# Patient Record
Sex: MALE | HISPANIC OR LATINO | Employment: FULL TIME | URBAN - METROPOLITAN AREA
[De-identification: names, ages, dates, MRNs, and addresses within clinical notes are randomized per-mention and may not be internally consistent; named-entity substitution may affect disease eponyms.]

---

## 2019-04-09 ENCOUNTER — OFFICE VISIT (OUTPATIENT)
Dept: URGENT CARE | Facility: PHYSICIAN GROUP | Age: 44
End: 2019-04-09
Payer: COMMERCIAL

## 2019-04-09 ENCOUNTER — TELEPHONE (OUTPATIENT)
Dept: SCHEDULING | Facility: IMAGING CENTER | Age: 44
End: 2019-04-09

## 2019-04-09 VITALS
WEIGHT: 200 LBS | DIASTOLIC BLOOD PRESSURE: 76 MMHG | OXYGEN SATURATION: 96 % | SYSTOLIC BLOOD PRESSURE: 122 MMHG | BODY MASS INDEX: 29.62 KG/M2 | TEMPERATURE: 98.3 F | HEIGHT: 69 IN | HEART RATE: 78 BPM

## 2019-04-09 DIAGNOSIS — M79.641 RIGHT HAND PAIN: ICD-10-CM

## 2019-04-09 DIAGNOSIS — M10.9 ACUTE GOUT OF RIGHT HAND, UNSPECIFIED CAUSE: ICD-10-CM

## 2019-04-09 PROCEDURE — 99203 OFFICE O/P NEW LOW 30 MIN: CPT | Performed by: NURSE PRACTITIONER

## 2019-04-09 RX ORDER — METHYLPREDNISOLONE 4 MG/1
4 TABLET ORAL DAILY
Qty: 1 KIT | Refills: 0 | Status: SHIPPED | OUTPATIENT
Start: 2019-04-09 | End: 2019-06-06

## 2019-04-09 RX ORDER — INDOMETHACIN 50 MG/1
50 CAPSULE ORAL 3 TIMES DAILY
Qty: 15 CAP | Refills: 0 | Status: SHIPPED | OUTPATIENT
Start: 2019-04-09 | End: 2019-06-06 | Stop reason: SDUPTHER

## 2019-04-09 RX ORDER — IBUPROFEN 200 MG
200 TABLET ORAL EVERY 6 HOURS PRN
COMMUNITY
End: 2019-06-06

## 2019-04-09 NOTE — LETTER
April 9, 2019         Patient: Jhonny Snyder   YOB: 1975   Date of Visit: 4/9/2019           To Whom it May Concern:    Jhonny Snyder was seen in my clinic on 4/9/2019. Please excuse him from work 4/10/19.        Sincerely,           CICI Singh.  Electronically Signed

## 2019-04-10 NOTE — PROGRESS NOTES
"Subjective:      Jhonny Snyder is a 43 y.o. male who presents with Gout (Rt wrist swelling and pain x1 wk )            This is a new problem. Pt reports new onset of right hand pain and swelling that started one week ago. He does report a hx of gout and this feels very similar. He denies any injury to his hand. He has been taking aleve without relief. Denies any fevers or flu like symptoms.        Review of Systems   Musculoskeletal:        Right hand pain and swelling   All other systems reviewed and are negative.    No past medical history on file. No past surgical history on file.   Social History     Social History   • Marital status: Single     Spouse name: N/A   • Number of children: N/A   • Years of education: N/A     Occupational History   • Not on file.     Social History Main Topics   • Smoking status: Not on file   • Smokeless tobacco: Not on file   • Alcohol use Not on file   • Drug use: Unknown   • Sexual activity: Not on file     Other Topics Concern   • Not on file     Social History Narrative   • No narrative on file          Objective:     /76 (BP Location: Left arm, Patient Position: Sitting, BP Cuff Size: Adult)   Pulse 78   Temp 36.8 °C (98.3 °F) (Temporal)   Ht 1.753 m (5' 9\")   Wt 90.7 kg (200 lb)   SpO2 96%   BMI 29.53 kg/m²      Physical Exam   Constitutional: He is oriented to person, place, and time. Vital signs are normal. He appears well-developed and well-nourished.   HENT:   Head: Normocephalic and atraumatic.   Eyes: Pupils are equal, round, and reactive to light. EOM are normal.   Neck: Normal range of motion.   Cardiovascular: Normal rate and regular rhythm.    Pulmonary/Chest: Effort normal.   Musculoskeletal: Normal range of motion.        Right hand: He exhibits tenderness and swelling. Normal sensation noted. Normal strength noted.        Hands:  Neurological: He is alert and oriented to person, place, and time.   Skin: Skin is warm and dry. Capillary refill " takes less than 2 seconds.   Psychiatric: He has a normal mood and affect. His speech is normal and behavior is normal. Thought content normal.   Vitals reviewed.              Assessment/Plan:     1. Right hand pain    2. Acute gout of right hand, unspecified cause  - MethylPREDNISolone (MEDROL DOSEPAK) 4 MG Tablet Therapy Pack; Take 1 Tab by mouth every day.  Dispense: 1 Kit; Refill: 0  - indomethacin (INDOCIN) 50 MG Cap; Take 1 Cap by mouth 3 times a day for 5 days.  Dispense: 15 Cap; Refill: 0    May take tylenol as needed for additional pain relief  Activity as tolerated with right hand  Work note provided  Supportive care, differential diagnoses, and indications for immediate follow-up discussed with patient.    Pathogenesis of diagnosis discussed including typical length and natural progression.      Instructed to return to  or nearest emergency department if symptoms fail to improve, for any change in condition, further concerns, or new concerning symptoms.  Patient states understanding of the plan of care and discharge instructions.

## 2019-06-06 ENCOUNTER — APPOINTMENT (OUTPATIENT)
Dept: RADIOLOGY | Facility: IMAGING CENTER | Age: 44
End: 2019-06-06
Attending: INTERNAL MEDICINE
Payer: COMMERCIAL

## 2019-06-06 ENCOUNTER — OFFICE VISIT (OUTPATIENT)
Dept: MEDICAL GROUP | Facility: PHYSICIAN GROUP | Age: 44
End: 2019-06-06
Payer: COMMERCIAL

## 2019-06-06 VITALS
HEIGHT: 69 IN | DIASTOLIC BLOOD PRESSURE: 78 MMHG | OXYGEN SATURATION: 94 % | TEMPERATURE: 98.3 F | BODY MASS INDEX: 28.58 KG/M2 | SYSTOLIC BLOOD PRESSURE: 116 MMHG | WEIGHT: 193 LBS | HEART RATE: 74 BPM

## 2019-06-06 DIAGNOSIS — F10.21 CHRONIC ALCOHOLISM IN REMISSION (HCC): ICD-10-CM

## 2019-06-06 DIAGNOSIS — Z23 NEED FOR VACCINATION: ICD-10-CM

## 2019-06-06 DIAGNOSIS — M10.9 GOUT OF MULTIPLE SITES, UNSPECIFIED CAUSE, UNSPECIFIED CHRONICITY: ICD-10-CM

## 2019-06-06 DIAGNOSIS — Z13.228 ENCOUNTER FOR SCREENING FOR METABOLIC DISORDER: ICD-10-CM

## 2019-06-06 DIAGNOSIS — S62.001A CLOSED NONDISPLACED FRACTURE OF SCAPHOID OF RIGHT WRIST, UNSPECIFIED PORTION OF SCAPHOID, INITIAL ENCOUNTER: ICD-10-CM

## 2019-06-06 DIAGNOSIS — M10.9 ACUTE GOUT OF RIGHT HAND, UNSPECIFIED CAUSE: ICD-10-CM

## 2019-06-06 PROBLEM — Z00.00 HEALTHCARE MAINTENANCE: Status: ACTIVE | Noted: 2019-06-06

## 2019-06-06 PROBLEM — M1A.09X0 CHRONIC GOUT OF MULTIPLE SITES: Status: ACTIVE | Noted: 2019-06-06

## 2019-06-06 PROCEDURE — 99204 OFFICE O/P NEW MOD 45 MIN: CPT | Mod: 25 | Performed by: INTERNAL MEDICINE

## 2019-06-06 PROCEDURE — 90471 IMMUNIZATION ADMIN: CPT | Performed by: INTERNAL MEDICINE

## 2019-06-06 PROCEDURE — 77077 JOINT SURVEY SINGLE VIEW: CPT | Mod: TC | Performed by: INTERNAL MEDICINE

## 2019-06-06 PROCEDURE — 90715 TDAP VACCINE 7 YRS/> IM: CPT | Performed by: INTERNAL MEDICINE

## 2019-06-06 RX ORDER — INDOMETHACIN 50 MG/1
50 CAPSULE ORAL 2 TIMES DAILY PRN
Qty: 30 CAP | Refills: 1 | Status: SHIPPED | OUTPATIENT
Start: 2019-06-06 | End: 2019-11-12 | Stop reason: SDUPTHER

## 2019-06-06 RX ORDER — ALLOPURINOL 300 MG/1
300 TABLET ORAL DAILY
Qty: 90 TAB | Refills: 1 | Status: SHIPPED | OUTPATIENT
Start: 2019-06-06 | End: 2019-06-06

## 2019-06-06 ASSESSMENT — PATIENT HEALTH QUESTIONNAIRE - PHQ9: CLINICAL INTERPRETATION OF PHQ2 SCORE: 0

## 2019-06-06 NOTE — ASSESSMENT & PLAN NOTE
This is a new problem which patient says he got diagnosed recently and got treatment in April 2019. Currently has pain in the Rt wrist pain which started 2 months back. He visited UC at that time and got the medications Indomethacin and medrol dosepak. Patient does say that he had previous attacks in 2015 on the great toes of both feet, right knee joint.

## 2019-06-06 NOTE — ASSESSMENT & PLAN NOTE
Patient has been drinking alcohol every weekend which was heavy as mentioned in the past but since 2015 when he had the first gout attack at the time patient quit drinking and has been sober since then.

## 2019-06-06 NOTE — PROGRESS NOTES
CC: Establish care with new PCP, gout.    HISTORY OF THE PRESENT ILLNESS: Patient is a 43 y.o. male. This pleasant patient is here today to discuss on medical conditions as mentioned in HPI below.    Health Maintenance: Due for tetanus vaccine okay to get in the office today.      Gout of multiple sites  This is a new problem which patient says he got diagnosed recently and got treatment in April 2019. Currently has pain in the Rt wrist pain which started 2 months back. He visited  at that time and got the medications Indomethacin and medrol dosepak. Patient does say that he had previous attacks in 2015 on the great toes of both feet, right knee joint.    Chronic alcoholism in remission (HCC)  Patient has been drinking alcohol every weekend which was heavy as mentioned in the past but since 2015 when he had the first gout attack at the time patient quit drinking and has been sober since then.    PHQ score 0, BMI within normal limits, former tobacco, no fall injuries    Allergies: Patient has no known allergies.    Current Outpatient Prescriptions Ordered in [facility]   Medication Sig Dispense Refill   • allopurinol (ZYLOPRIM) 300 MG Tab Take 1 Tab by mouth every day. 90 Tab 1     No current Epic-ordered facility-administered medications on file.        History reviewed. No pertinent past medical history.    History reviewed. No pertinent surgical history.    Social History   Substance Use Topics   • Smoking status: Former Smoker     Packs/day: 0.50     Years: 25.00     Types: Cigarettes     Quit date: 1/1/2016   • Smokeless tobacco: Never Used      Comment: Started in late 30's 1PP/ 2 days.   • Alcohol use No      Comment: Stopped when diagnosed with gout. Before that heavy drinking on weekends       Social History     Social History Narrative   • No narrative on file       Family History   Problem Relation Age of Onset   • Cancer Mother         ?Unspecified   • Hypertension Father    • Diabetes Father        ROS:      "- Constitutional: Negative for fever, chills, unexpected weight change, and fatigue/generalized weakness.     - HEENT: Negative for headaches, vision changes, hearing changes, ear pain, ear discharge, rhinorrhea, sinus congestion, sore throat, and neck pain.      - Respiratory: Negative for cough, sputum production, chest congestion, dyspnea, wheezing, and crackles.      - Cardiovascular: Negative for chest pain, palpitations, orthopnea, and bilateral lower extremity edema.     - Gastrointestinal: Negative for heartburn, nausea, vomiting, abdominal pain, hematochezia, melena, diarrhea, constipation, and greasy/foul-smelling stools.     - Genitourinary: Negative for dysuria, polyuria, hematuria, pyuria, urinary urgency, and urinary incontinence.     - Musculoskeletal: As mentioned in HPI above negative for myalgias, back pain.     - Skin: Negative for rash, itching, cyanotic skin color change.     - Neurological: Negative for dizziness, tingling, tremors, focal sensory deficit, focal weakness and headaches.     - Endo/Heme/Allergies: Does not bruise/bleed easily.     - Psychiatric/Behavioral: Negative for depression, suicidal/homicidal ideation and memory loss.        No recent labs done, patient requesting for all the baseline labs for his age.    Exam: /78 (BP Location: Right arm, Patient Position: Sitting, BP Cuff Size: Adult)   Pulse 74   Temp 36.8 °C (98.3 °F) (Temporal)   Ht 1.753 m (5' 9\")   Wt 87.5 kg (193 lb)   SpO2 94%  Body mass index is 28.5 kg/m².    General: Normal appearing. No distress.  HEENT: Normocephalic. Eyes conjunctiva clear lids without ptosis, pupils equal and reactive to light accommodation, ears normal shape and contour, canals are clear bilaterally, tympanic membranes are benign, nasal mucosa benign, oropharynx is without erythema, edema or exudates.   Neck: Supple without JVD or bruit. Thyroid is not enlarged.  Pulmonary: Clear to ausculation.  Normal effort. No rales, ronchi, " or wheezing.  Cardiovascular: Regular rate and rhythm without murmur. Carotid and radial pulses are intact and equal bilaterally.  Abdomen: Soft, nontender, nondistended. Normal bowel sounds. Liver and spleen are not palpable  Neurologic: Grossly nonfocal  Lymph: No cervical, supraclavicular or axillary lymph nodes are palpable  Skin: Warm and dry.  No obvious lesions.  Musculoskeletal: Normal gait. No extremity cyanosis, clubbing, or edema.  Wrist exam : Positive for tenderness on the right wrist on palpation, restriction of range of movements with positive Phalen's test more on the right compared to the left.   No other acute problems in other joints at this time.  Psych: Normal mood and affect. Alert and oriented x3. Judgment and insight is normal.    Please note that this dictation was created using voice recognition software. I have made every reasonable attempt to correct obvious errors, but I expect that there are errors of grammar and possibly content that I did not discover before finalizing the note.      Assessment/Plan  1. Gout of multiple sites, unspecified cause, unspecified chronicity  New problem, recent urgent care visit with treatment for symptoms with a Medrol Dosepak and indomethacin.  Patient requesting for permanent treatment for this to it prevent the attacks.  No lab work done anytime.  Given his multiple joint involvement will also do a rheumatoid arthritis panel along with his uric acid levels at this time.  Patient was explained all the plan which he understood and agreed.  We will start him on allopurinol 300 mg daily.  - RHEUMATOID ARTHRITIS FACTOR; Future  - WESTERGREN SED RATE; Future  - CCP ANTIBODY; Future  - URIC ACID; Future  - DX-JOINT SURVEY-HANDS SINGLE VIEW; Future  - allopurinol (ZYLOPRIM) 300 MG Tab; Take 1 Tab by mouth every day.  Dispense: 90 Tab; Refill: 1    2. Encounter for screening for metabolic disorder  We will do the baseline labs including CBC renal function and  lipid panel for his age.  - CBC WITH DIFFERENTIAL; Future  - Comp Metabolic Panel; Future  - Lipid Profile; Future    3. Need for vaccination  - Tdap =>8yo IM    4. Chronic alcoholism in remission (HCC)  Given instructions to abstain from alcohol.

## 2019-06-14 LAB
ALBUMIN SERPL-MCNC: 4.5 G/DL (ref 3.5–5.5)
ALBUMIN/GLOB SERPL: 1.9 {RATIO} (ref 1.2–2.2)
ALP SERPL-CCNC: 72 IU/L (ref 39–117)
ALT SERPL-CCNC: 16 IU/L (ref 0–44)
AST SERPL-CCNC: 15 IU/L (ref 0–40)
BASOPHILS # BLD AUTO: 0 X10E3/UL (ref 0–0.2)
BASOPHILS NFR BLD AUTO: 1 %
BILIRUB SERPL-MCNC: 0.5 MG/DL (ref 0–1.2)
BUN SERPL-MCNC: 12 MG/DL (ref 6–24)
BUN/CREAT SERPL: 12 (ref 9–20)
CALCIUM SERPL-MCNC: 9.3 MG/DL (ref 8.7–10.2)
CCP IGA+IGG SERPL IA-ACNC: 3 UNITS (ref 0–19)
CHLORIDE SERPL-SCNC: 106 MMOL/L (ref 96–106)
CHOLEST SERPL-MCNC: 128 MG/DL (ref 100–199)
CO2 SERPL-SCNC: 24 MMOL/L (ref 20–29)
CREAT SERPL-MCNC: 0.97 MG/DL (ref 0.76–1.27)
EOSINOPHIL # BLD AUTO: 0.1 X10E3/UL (ref 0–0.4)
EOSINOPHIL NFR BLD AUTO: 3 %
ERYTHROCYTE [DISTWIDTH] IN BLOOD BY AUTOMATED COUNT: 12.5 % (ref 12.3–15.4)
ERYTHROCYTE [SEDIMENTATION RATE] IN BLOOD BY WESTERGREN METHOD: 2 MM/HR (ref 0–15)
GLOBULIN SER CALC-MCNC: 2.4 G/DL (ref 1.5–4.5)
GLUCOSE SERPL-MCNC: 97 MG/DL (ref 65–99)
HCT VFR BLD AUTO: 43.7 % (ref 37.5–51)
HDLC SERPL-MCNC: 45 MG/DL
HGB BLD-MCNC: 14.7 G/DL (ref 13–17.7)
IMM GRANULOCYTES # BLD AUTO: 0 X10E3/UL (ref 0–0.1)
IMM GRANULOCYTES NFR BLD AUTO: 0 %
IMMATURE CELLS  115398: NORMAL
LABORATORY COMMENT REPORT: NORMAL
LDLC SERPL CALC-MCNC: 68 MG/DL (ref 0–99)
LYMPHOCYTES # BLD AUTO: 1.4 X10E3/UL (ref 0.7–3.1)
LYMPHOCYTES NFR BLD AUTO: 32 %
MCH RBC QN AUTO: 29.9 PG (ref 26.6–33)
MCHC RBC AUTO-ENTMCNC: 33.6 G/DL (ref 31.5–35.7)
MCV RBC AUTO: 89 FL (ref 79–97)
MONOCYTES # BLD AUTO: 0.5 X10E3/UL (ref 0.1–0.9)
MONOCYTES NFR BLD AUTO: 10 %
MORPHOLOGY BLD-IMP: NORMAL
NEUTROPHILS # BLD AUTO: 2.4 X10E3/UL (ref 1.4–7)
NEUTROPHILS NFR BLD AUTO: 54 %
NRBC BLD AUTO-RTO: NORMAL %
PLATELET # BLD AUTO: 214 X10E3/UL (ref 150–450)
POTASSIUM SERPL-SCNC: 4.6 MMOL/L (ref 3.5–5.2)
PROT SERPL-MCNC: 6.9 G/DL (ref 6–8.5)
RBC # BLD AUTO: 4.92 X10E6/UL (ref 4.14–5.8)
RHEUMATOID FACT SERPL-ACNC: <10 IU/ML (ref 0–13.9)
SODIUM SERPL-SCNC: 143 MMOL/L (ref 134–144)
TRIGL SERPL-MCNC: 77 MG/DL (ref 0–149)
URATE SERPL-MCNC: 7.8 MG/DL (ref 3.7–8.6)
VLDLC SERPL CALC-MCNC: 15 MG/DL (ref 5–40)
WBC # BLD AUTO: 4.3 X10E3/UL (ref 3.4–10.8)

## 2019-09-19 ENCOUNTER — APPOINTMENT (OUTPATIENT)
Dept: RADIOLOGY | Facility: IMAGING CENTER | Age: 44
End: 2019-09-19
Attending: FAMILY MEDICINE
Payer: COMMERCIAL

## 2019-09-19 ENCOUNTER — OFFICE VISIT (OUTPATIENT)
Dept: MEDICAL GROUP | Facility: CLINIC | Age: 44
End: 2019-09-19
Payer: COMMERCIAL

## 2019-09-19 VITALS
BODY MASS INDEX: 28.58 KG/M2 | HEIGHT: 69 IN | WEIGHT: 193 LBS | SYSTOLIC BLOOD PRESSURE: 132 MMHG | TEMPERATURE: 98.1 F | RESPIRATION RATE: 16 BRPM | OXYGEN SATURATION: 97 % | DIASTOLIC BLOOD PRESSURE: 90 MMHG | HEART RATE: 96 BPM

## 2019-09-19 DIAGNOSIS — S62.021K CLOSED DISPLACED FRACTURE OF MIDDLE THIRD OF SCAPHOID OF RIGHT WRIST WITH NONUNION: ICD-10-CM

## 2019-09-19 PROCEDURE — 99203 OFFICE O/P NEW LOW 30 MIN: CPT | Performed by: FAMILY MEDICINE

## 2019-09-19 PROCEDURE — 73110 X-RAY EXAM OF WRIST: CPT | Mod: TC,RT | Performed by: FAMILY MEDICINE

## 2019-09-19 ASSESSMENT — ENCOUNTER SYMPTOMS
VOMITING: 0
FEVER: 0
DIZZINESS: 0
NAUSEA: 0
CHILLS: 0
SHORTNESS OF BREATH: 0

## 2019-09-19 NOTE — PROGRESS NOTES
"Subjective:      Jhonny Snyder is a 44 y.o. male who presents with Wrist Injury (Referral from PCP/ R wrist injury )     Referred by Kristal Melo M.D. for evaluation of RIGHT wrist pain    HPI  RIGHT wrist pain  Date of incident, April, second or third of 2019  He was tugging and rolling upholstery for stapling  As he was doing the rolling maneuver he felt a pop at the radial side of the RIGHT wrist  Worse with activity, gripping  Improved with rest  Strong, sharp burning pain  Radiates predominantly in the RADIAL side of the wrist and the ulnar side of the wrist  Wrist brace has helped temporarily  POSITIVE night symptoms  POSITIVE prior history of gout, took some gout medication which helped initially  Had x-rays in July and was diagnosed \"fractured wrist\"    Replaces upholstery    Review of Systems   Constitutional: Negative for chills and fever.   Respiratory: Negative for shortness of breath.    Cardiovascular: Negative for chest pain.   Gastrointestinal: Negative for nausea and vomiting.   Neurological: Negative for dizziness.     PMH:  has no past medical history on file.  MEDS:   Current Outpatient Medications:   •  indomethacin (INDOCIN) 50 MG Cap, Take 1 Cap by mouth 2 times a day as needed., Disp: 30 Cap, Rfl: 1  ALLERGIES: No Known Allergies  SURGHX: History reviewed. No pertinent surgical history.  SOCHX:  reports that he quit smoking about 3 years ago. His smoking use included cigarettes. He has a 12.50 pack-year smoking history. He has never used smokeless tobacco. He reports that he does not drink alcohol or use drugs.  FH: Family history was reviewed, no pertinent findings to report     Objective:     /90 (BP Location: Right arm, Patient Position: Sitting, BP Cuff Size: Adult)   Pulse 96   Temp 36.7 °C (98.1 °F) (Temporal)   Resp 16   Ht 1.753 m (5' 9\")   Wt 87.5 kg (193 lb)   SpO2 97%   BMI 28.50 kg/m²      Physical Exam     Hand exam    NAD  Alert and oriented    BILATERAL " "ELBOW exam  Range of motion intact  No swelling  No tenderness the medial or lateral epicondyle  Jacobo test NEGATIVE    BILATERAL WRIST exam   Range of motion limited to approximately 70% normal motion BILATERALLY  No tenderness along scaphoid, TFCC insertion, distal radius or distal ulna  Tinel's testing is NEGATIVE  The hand is otherwise neurovascularly intact    Assessment/Plan:      1. 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  DX-WRIST-COMPLETE 3+ RIGHT    REFERRAL TO ORTHOPEDICS     Does recall remote history of back in 2015 when he fell with a closed fist onto the ground  After that injury took him approximately \"1 month to recover\"  He did not seek any medical care at that time    Suspect his RIGHT wrist injury back in 2015 resulted in poorly healed RIGHT scaphoid fracture and he broke through while upholstering as noted above    Since this is a old injury, but a vital component of wrist function, recommend hand specialist/orthopedic evaluation for consideration of salvage procedure (referral to orthopedic hand specialist)          9/19/2019 3:57 PM    HISTORY/REASON FOR EXAM:  Atraumatic Pain/Swelling/Deformity.  Persistent RIGHT wrist pain, injury approximately 5 months ago.    TECHNIQUE/EXAM DESCRIPTION AND NUMBER OF VIEWS:  4 views of the RIGHT wrist.    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  Evidence of old trauma to the proximal 4th and 5th metacarpals.  Mild narrowing of radiocarpal joint.  No focal soft tissue swelling.      Impression       1.  No acute fracture or dislocation of RIGHT wrist.  2.  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  3.  Evidence of old trauma of the 4th and 5th metacarpals.     Interpreted in the office today with the patient    Thank you Kristal Melo M.D. for allowing me to participate in caring for your patient.  "

## 2019-11-12 ENCOUNTER — APPOINTMENT (OUTPATIENT)
Dept: RADIOLOGY | Facility: IMAGING CENTER | Age: 44
End: 2019-11-12
Attending: INTERNAL MEDICINE
Payer: COMMERCIAL

## 2019-11-12 ENCOUNTER — OFFICE VISIT (OUTPATIENT)
Dept: MEDICAL GROUP | Facility: PHYSICIAN GROUP | Age: 44
End: 2019-11-12
Payer: COMMERCIAL

## 2019-11-12 VITALS
SYSTOLIC BLOOD PRESSURE: 130 MMHG | BODY MASS INDEX: 28.29 KG/M2 | HEIGHT: 69 IN | WEIGHT: 191 LBS | TEMPERATURE: 98 F | DIASTOLIC BLOOD PRESSURE: 80 MMHG | OXYGEN SATURATION: 98 % | HEART RATE: 107 BPM

## 2019-11-12 DIAGNOSIS — M25.532 LEFT WRIST PAIN: ICD-10-CM

## 2019-11-12 DIAGNOSIS — S62.001A CLOSED NONDISPLACED FRACTURE OF SCAPHOID OF RIGHT WRIST, UNSPECIFIED PORTION OF SCAPHOID, INITIAL ENCOUNTER: ICD-10-CM

## 2019-11-12 DIAGNOSIS — M79.675 GREAT TOE PAIN, LEFT: ICD-10-CM

## 2019-11-12 DIAGNOSIS — M10.9 ACUTE GOUT OF MULTIPLE SITES, UNSPECIFIED CAUSE: ICD-10-CM

## 2019-11-12 PROBLEM — S62.021A: Status: ACTIVE | Noted: 2019-11-12

## 2019-11-12 PROCEDURE — 73620 X-RAY EXAM OF FOOT: CPT | Mod: TC,LT | Performed by: INTERNAL MEDICINE

## 2019-11-12 PROCEDURE — 73100 X-RAY EXAM OF WRIST: CPT | Mod: TC,LT | Performed by: INTERNAL MEDICINE

## 2019-11-12 PROCEDURE — 99214 OFFICE O/P EST MOD 30 MIN: CPT | Performed by: INTERNAL MEDICINE

## 2019-11-12 RX ORDER — ALLOPURINOL 300 MG/1
TABLET ORAL
Qty: 90 TAB | Refills: 2 | Status: SHIPPED | OUTPATIENT
Start: 2019-11-12 | End: 2021-09-18 | Stop reason: SDUPTHER

## 2019-11-12 RX ORDER — ALLOPURINOL 300 MG/1
TABLET ORAL
COMMUNITY
End: 2019-11-12 | Stop reason: SDUPTHER

## 2019-11-12 RX ORDER — INDOMETHACIN 50 MG/1
50 CAPSULE ORAL 2 TIMES DAILY PRN
Qty: 30 CAP | Refills: 1 | Status: ON HOLD | OUTPATIENT
Start: 2019-11-12 | End: 2021-04-03

## 2019-11-12 RX ORDER — OXYCODONE AND ACETAMINOPHEN 10; 325 MG/1; MG/1
1-2 TABLET ORAL EVERY 4 HOURS PRN
COMMUNITY
End: 2021-04-02

## 2019-11-12 RX ORDER — COLCHICINE 0.6 MG/1
TABLET ORAL
Qty: 10 TAB | Refills: 3 | Status: ON HOLD | OUTPATIENT
Start: 2019-11-12 | End: 2021-04-03

## 2019-11-12 NOTE — ASSESSMENT & PLAN NOTE
This is a new problem which was diagnosed in 06/2019 and was referred to [facility] , will need to get the records.  11/5/2019 - S/P extraction of the fractured scaphoid bone. Currently on Percocet managed by [facility].

## 2019-11-12 NOTE — PROGRESS NOTES
CC: Acute visit, gout.    HISTORY OF PRESENT ILLNESS: Patient is a 44 y.o. male established patient who presents today to discuss on medical conditions as mentioned in HPI below.    Health Maintenance: Due for flu vaccine refusing, all risks understood.    Closed nondisplaced fracture of scaphoid bone of right wrist  This is a new problem which was diagnosed in 06/2019 and was referred to [facility] , will need to get the records.  11/5/2019 - S/P extraction of the fractured scaphoid bone. Currently on Percocet managed by [facility].     Left wrist pain  This is a new problem which started 3 days back and denies any trauma . Does have swelling of the left great toe. Pt supposed to be on allopurinol which he stopped after his first bottle in 06/2019 more than 3-4 months back.      PHQ score 0, BMI within normal limits, former tobacco, no fall injuries.    Patient Active Problem List    Diagnosis Date Noted   • Closed nondisplaced fracture of scaphoid bone of right wrist 11/12/2019   • Left wrist pain 11/12/2019   • Great toe pain, left 11/12/2019   • Gout of multiple sites 06/06/2019   • Healthcare maintenance 06/06/2019   • Chronic alcoholism in remission (HCC) 06/06/2019      Allergies:Patient has no known allergies.    Current Outpatient Medications   Medication Sig Dispense Refill   • oxyCODONE-acetaminophen (PERCOCET-10)  MG Tab Take 1-2 Tabs by mouth every four hours as needed for Severe Pain.     • indomethacin (INDOCIN) 50 MG Cap Take 1 Cap by mouth 2 times a day as needed. 30 Cap 1   • allopurinol (ZYLOPRIM) 300 MG Tab allopurinol 300 mg tablet 90 Tab 2     No current facility-administered medications for this visit.        Social History     Tobacco Use   • Smoking status: Former Smoker     Packs/day: 0.50     Years: 25.00     Pack years: 12.50     Types: Cigarettes     Last attempt to quit: 1/1/2016     Years since quitting: 3.8   • Smokeless tobacco: Never Used   • Tobacco comment: Started in late 30's 1PP/ 2  "days.   Substance Use Topics   • Alcohol use: Not Currently   • Drug use: No     Social History     Patient does not qualify to have social determinant information on file (likely too young).   Social History Narrative   • Not on file       Family History   Problem Relation Age of Onset   • Cancer Mother         ?Unspecified   • Hypertension Father    • Diabetes Father         ROS:     - Constitutional:  Negative for fever, chills, unexpected weight change, and fatigue/generalized weakness.    - HEENT:  Negative for headaches, vision changes, hearing changes, ear pain, ear discharge, rhinorrhea, sinus congestion, sore throat, and neck pain.      - Respiratory: Negative for cough, sputum production, chest congestion, dyspnea, wheezing, and crackles.      - Cardiovascular: Negative for chest pain, palpitations, orthopnea, and bilateral lower extremity edema.     - Gastrointestinal: Negative for heartburn, nausea, vomiting, abdominal pain, hematochezia, melena, diarrhea, constipation, and greasy/foul-smelling stools.     - Genitourinary: Negative for dysuria, polyuria, hematuria, pyuria, urinary urgency, and urinary incontinence.     - Musculoskeletal:As mentioned in HPI above Negative for myalgias, back pain.     - Skin: Negative for rash, itching, cyanotic skin color change.     - Neurological: Negative for dizziness, tingling, tremors, focal sensory deficit, focal weakness and headaches.     - Endo/Heme/Allergies: Does not bruise/bleed easily.     - Psychiatric/Behavioral: Negative for depression, suicidal/homicidal ideation and memory loss.      Last lab work in June 2019 reviewed and discussed with patient.    Exam:    /80 (BP Location: Left arm, Patient Position: Sitting, BP Cuff Size: Adult)   Pulse (!) 107   Temp 36.7 °C (98 °F) (Temporal)   Ht 1.753 m (5' 9\")   Wt 86.6 kg (191 lb)   SpO2 98%  Body mass index is 28.21 kg/m².    General:  Well nourished, well developed male in NAD  Head is grossly " normal.  Neck: Supple without JVD or bruit. Thyroid is not enlarged.  Pulmonary: Clear to ausculation and percussion.  Normal effort. No rales, ronchi, or wheezing.  Cardiovascular: Regular rate and rhythm without murmur. Carotid and radial pulses are intact and equal bilaterally.  Extremities: no clubbing, cyanosis, or edema.  Joint exam : Positive for acute swelling, erythema and tenderness on left wrist joint, left great toe at metatarsal head.  Range of movements not tested due to pain.  Right wrist intact on cast and mild swelling on the fingers but no color changes due to the recent surgery done as mentioned in HPI above.    Please note that this dictation was created using voice recognition software. I have made every reasonable attempt to correct obvious errors, but I expect that there are errors of grammar and possibly content that I did not discover before finalizing the note.    Assessment/Plan:  1. Acute gout of multiple sites, unspecified cause  2. Left wrist pain  3. Great toe pain, left  New problem, as mentioned in HPI above my physical exam findings will give him a course of colchicine for 2 days but patient also requesting refills of his indomethacin which emphasized to take it only after he completes colchicine.  Emphasized to be compliant with his allopurinol which patient has stopped taking and this flareup of gout again.  Also discussed on the foods he needs to avoid which was extensively discussed in the last visit.  - indomethacin (INDOCIN) 50 MG Cap; Take 1 Cap by mouth 2 times a day as needed.  Dispense: 30 Cap; Refill: 1  - allopurinol (ZYLOPRIM) 300 MG Tab; allopurinol 300 mg tablet  Dispense: 90 Tab; Refill: 2  - DX-FOOT-2- LEFT; Future  - colchicine (COLCRYS) 0.6 MG Tab; Take 2 tablets one time and then 1 tablet of the 1 hour.  Repeat the same after 3 days if no relief.  Dispense: 10 Tab; Refill: 3      4. Closed nondisplaced fracture of scaphoid of right wrist, unspecified portion of  scaphoid, initial encounter  Improving, as mentioned in history above my physical exam findings with recent surgery done at [facility] will get the records.  To follow-up with [facility].

## 2019-11-13 ENCOUNTER — TELEPHONE (OUTPATIENT)
Dept: URGENT CARE | Facility: PHYSICIAN GROUP | Age: 44
End: 2019-11-13

## 2019-11-13 NOTE — TELEPHONE ENCOUNTER
----- Message from Kristal Melo M.D. sent at 11/12/2019  8:42 PM PST -----  Your X ray was negative for fractures. Does have degenerative Osteoarthritis.  Kristal Melo M.D.

## 2019-11-13 NOTE — TELEPHONE ENCOUNTER
----- Message from Kristal Melo M.D. sent at 11/12/2019  8:42 PM PST -----  Your X ray was negative for fractures. Does have Gouty arthritis.  Kristal Melo M.D.

## 2020-09-17 ENCOUNTER — OFFICE VISIT (OUTPATIENT)
Dept: URGENT CARE | Facility: PHYSICIAN GROUP | Age: 45
End: 2020-09-17
Payer: COMMERCIAL

## 2020-09-17 VITALS
SYSTOLIC BLOOD PRESSURE: 142 MMHG | HEART RATE: 78 BPM | RESPIRATION RATE: 16 BRPM | WEIGHT: 190 LBS | DIASTOLIC BLOOD PRESSURE: 86 MMHG | HEIGHT: 69 IN | TEMPERATURE: 97.5 F | OXYGEN SATURATION: 96 % | BODY MASS INDEX: 28.14 KG/M2

## 2020-09-17 DIAGNOSIS — M79.641 RIGHT HAND PAIN: ICD-10-CM

## 2020-09-17 PROCEDURE — 99213 OFFICE O/P EST LOW 20 MIN: CPT | Performed by: NURSE PRACTITIONER

## 2020-09-17 ASSESSMENT — FIBROSIS 4 INDEX: FIB4 SCORE: 0.81

## 2020-09-17 NOTE — PROGRESS NOTES
Subjective:   Jhonny Snyder is a 45 y.o. male who presents for Wrist Pain (right wrist pain, has surgery a year ago , swelling, x3 days )       Wrist Pain   The incident occurred 3 to 5 days ago. Incident location: in general. Injury mechanism: prior surgery, right wrist. The pain is present in the right wrist. The quality of the pain is described as aching. The pain does not radiate. The pain is mild. The pain has been fluctuating since the incident. Pertinent negatives include no chest pain. The symptoms are aggravated by movement. He has tried acetaminophen, NSAIDs and immobilization for the symptoms. The treatment provided moderate relief.     Pt presents for evaluation of a new problem, reports right wrist pain.  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  Over the past 2 days, he did not wear his wrist brace, subsequently having right wrist pain and swelling.  Due to this, his employer is requesting evaluation and work note.  Patient states that this is not a work-related injury.    Review of Systems   Constitutional: Negative for chills, fever, malaise/fatigue and weight loss.   HENT: Negative for congestion and sore throat.    Respiratory: Negative for cough and shortness of breath.    Cardiovascular: Negative for chest pain, orthopnea and leg swelling.   Gastrointestinal: Negative for abdominal pain, nausea and vomiting.   Genitourinary: Negative for dysuria.   Musculoskeletal: Positive for joint pain. Negative for falls and myalgias.   Neurological: Negative for weakness and headaches.       MEDS:   Current Outpatient Medications:   •  oxyCODONE-acetaminophen (PERCOCET-10)  MG Tab, Take 1-2 Tabs by mouth every four hours as needed for Severe Pain., Disp: , Rfl:   •  indomethacin (INDOCIN) 50 MG Cap, Take 1 Cap by mouth 2 times a day as needed. (Patient not taking: Reported on 9/17/2020), Disp: 30 Cap, Rfl: 1  •   "allopurinol (ZYLOPRIM) 300 MG Tab, allopurinol 300 mg tablet (Patient not taking: Reported on 9/17/2020), Disp: 90 Tab, Rfl: 2  •  colchicine (COLCRYS) 0.6 MG Tab, Take 2 tablets one time and then 1 tablet of the 1 hour.  Repeat the same after 3 days if no relief. (Patient not taking: Reported on 9/17/2020), Disp: 10 Tab, Rfl: 3  ALLERGIES: No Known Allergies    Patient's PMH, SocHx, SurgHx, FamHx, Drug allergies and medications were reviewed.     Objective:   /86 (BP Location: Left arm, Patient Position: Sitting, BP Cuff Size: Adult)   Pulse 78   Temp 36.4 °C (97.5 °F) (Temporal)   Resp 16   Ht 1.753 m (5' 9\")   Wt 86.2 kg (190 lb)   SpO2 96%   BMI 28.06 kg/m²     Physical Exam  Vitals signs reviewed.   Constitutional:       General: He is awake.      Appearance: Normal appearance. He is well-developed.   HENT:      Head: Normocephalic and atraumatic.      Right Ear: External ear normal.      Left Ear: External ear normal.      Nose: Nose normal.      Mouth/Throat:      Mouth: Mucous membranes are moist.      Pharynx: Oropharynx is clear.   Eyes:      Extraocular Movements: Extraocular movements intact.      Conjunctiva/sclera: Conjunctivae normal.   Neck:      Musculoskeletal: Normal range of motion.   Cardiovascular:      Rate and Rhythm: Normal rate and regular rhythm.   Pulmonary:      Effort: Pulmonary effort is normal.   Musculoskeletal:      Right wrist: He exhibits decreased range of motion and swelling. He exhibits no crepitus and no deformity.      Comments: Well healed anterior wrist surgical incision, mild edema involving the joint, no TTP, slight decrease in ROM secondary to prior fusion   Skin:     General: Skin is warm and dry.   Neurological:      Mental Status: He is alert and oriented to person, place, and time.   Psychiatric:         Mood and Affect: Mood normal.         Behavior: Behavior normal.         Thought Content: Thought content normal.         Judgment: Judgment normal. "         Assessment/Plan:   Assessment       1. Right hand pain    Supportive care options discussed, recommend to utilize hand/wrist brace while at work due to prior injury.  Discussed the use of OTC medications as per package directions on a PRN basis.  Differential diagnosis, natural history, and indications for immediate follow-up were discussed.     Return to urgent care clinic or PCP if current symptoms do not improve and/or worsening symptoms occur. Advised of signs and symptoms which would warrant further evaluation and /or emergent evaluation in ER.  All questions answered and the patient agrees to the plan of care.

## 2020-09-17 NOTE — LETTER
September 17, 2020         Patient: Jhonny Snyder   YOB: 1975   Date of Visit: 9/17/2020           To Whom it May Concern:    Jhonny Snyder was seen in my clinic on 9/17/2020 for an acute, but not related to work wrist injury. He may return to work on 9/21/2020 without restrictions.    If you have any questions or concerns, please don't hesitate to call.        Sincerely,           Iva Barker A.P.R.N.  Electronically Signed

## 2020-09-18 ASSESSMENT — ENCOUNTER SYMPTOMS
WEIGHT LOSS: 0
HEADACHES: 0
SHORTNESS OF BREATH: 0
FALLS: 0
NAUSEA: 0
FEVER: 0
CHILLS: 0
ORTHOPNEA: 0
COUGH: 0
WEAKNESS: 0
MYALGIAS: 0
SORE THROAT: 0
ABDOMINAL PAIN: 0
VOMITING: 0

## 2020-11-13 ENCOUNTER — HOSPITAL ENCOUNTER (OUTPATIENT)
Facility: MEDICAL CENTER | Age: 45
End: 2020-11-13
Attending: PHYSICIAN ASSISTANT
Payer: COMMERCIAL

## 2020-11-13 PROCEDURE — U0003 INFECTIOUS AGENT DETECTION BY NUCLEIC ACID (DNA OR RNA); SEVERE ACUTE RESPIRATORY SYNDROME CORONAVIRUS 2 (SARS-COV-2) (CORONAVIRUS DISEASE [COVID-19]), AMPLIFIED PROBE TECHNIQUE, MAKING USE OF HIGH THROUGHPUT TECHNOLOGIES AS DESCRIBED BY CMS-2020-01-R: HCPCS

## 2020-11-14 LAB
COVID ORDER STATUS COVID19: NORMAL
SARS-COV-2 RNA RESP QL NAA+PROBE: DETECTED
SPECIMEN SOURCE: ABNORMAL

## 2021-04-02 ENCOUNTER — HOSPITAL ENCOUNTER (INPATIENT)
Facility: MEDICAL CENTER | Age: 46
LOS: 1 days | DRG: 149 | End: 2021-04-03
Attending: EMERGENCY MEDICINE | Admitting: STUDENT IN AN ORGANIZED HEALTH CARE EDUCATION/TRAINING PROGRAM
Payer: COMMERCIAL

## 2021-04-02 ENCOUNTER — APPOINTMENT (OUTPATIENT)
Dept: RADIOLOGY | Facility: MEDICAL CENTER | Age: 46
DRG: 149 | End: 2021-04-02
Attending: EMERGENCY MEDICINE
Payer: COMMERCIAL

## 2021-04-02 ENCOUNTER — OFFICE VISIT (OUTPATIENT)
Dept: URGENT CARE | Facility: PHYSICIAN GROUP | Age: 46
End: 2021-04-02
Payer: COMMERCIAL

## 2021-04-02 ENCOUNTER — APPOINTMENT (OUTPATIENT)
Dept: CARDIOLOGY | Facility: MEDICAL CENTER | Age: 46
DRG: 149 | End: 2021-04-02
Attending: STUDENT IN AN ORGANIZED HEALTH CARE EDUCATION/TRAINING PROGRAM
Payer: COMMERCIAL

## 2021-04-02 VITALS
WEIGHT: 180 LBS | SYSTOLIC BLOOD PRESSURE: 160 MMHG | OXYGEN SATURATION: 95 % | DIASTOLIC BLOOD PRESSURE: 100 MMHG | HEIGHT: 69 IN | HEART RATE: 93 BPM | TEMPERATURE: 97.9 F | BODY MASS INDEX: 26.66 KG/M2

## 2021-04-02 DIAGNOSIS — R20.0 NUMBNESS OF LIP: ICD-10-CM

## 2021-04-02 DIAGNOSIS — R42 DISEQUILIBRIUM: ICD-10-CM

## 2021-04-02 DIAGNOSIS — I63.9 CEREBROVASCULAR ACCIDENT (CVA), UNSPECIFIED MECHANISM (HCC): ICD-10-CM

## 2021-04-02 DIAGNOSIS — R42 DIZZINESS: ICD-10-CM

## 2021-04-02 DIAGNOSIS — R07.9 CHEST PAIN, UNSPECIFIED TYPE: ICD-10-CM

## 2021-04-02 DIAGNOSIS — R11.2 NAUSEA AND VOMITING, INTRACTABILITY OF VOMITING NOT SPECIFIED, UNSPECIFIED VOMITING TYPE: ICD-10-CM

## 2021-04-02 PROBLEM — I67.1 ANTERIOR CEREBRAL ARTERY ANEURYSM: Status: ACTIVE | Noted: 2021-04-02

## 2021-04-02 PROBLEM — E04.1 THYROID NODULE: Status: ACTIVE | Noted: 2021-04-02

## 2021-04-02 LAB
ABO GROUP BLD: NORMAL
ALBUMIN SERPL BCP-MCNC: 4.8 G/DL (ref 3.2–4.9)
ALBUMIN/GLOB SERPL: 1.7 G/DL
ALP SERPL-CCNC: 69 U/L (ref 30–99)
ALT SERPL-CCNC: 14 U/L (ref 2–50)
ANION GAP SERPL CALC-SCNC: 9 MMOL/L (ref 7–16)
APTT PPP: 25.9 SEC (ref 24.7–36)
AST SERPL-CCNC: 11 U/L (ref 12–45)
BASOPHILS # BLD AUTO: 0.2 % (ref 0–1.8)
BASOPHILS # BLD: 0.02 K/UL (ref 0–0.12)
BILIRUB SERPL-MCNC: 0.7 MG/DL (ref 0.1–1.5)
BLD GP AB SCN SERPL QL: NORMAL
BUN SERPL-MCNC: 9 MG/DL (ref 8–22)
CALCIUM SERPL-MCNC: 9.4 MG/DL (ref 8.5–10.5)
CHLORIDE SERPL-SCNC: 106 MMOL/L (ref 96–112)
CO2 SERPL-SCNC: 24 MMOL/L (ref 20–33)
CREAT SERPL-MCNC: 0.86 MG/DL (ref 0.5–1.4)
EKG IMPRESSION: NORMAL
EOSINOPHIL # BLD AUTO: 0.04 K/UL (ref 0–0.51)
EOSINOPHIL NFR BLD: 0.4 % (ref 0–6.9)
ERYTHROCYTE [DISTWIDTH] IN BLOOD BY AUTOMATED COUNT: 38.8 FL (ref 35.9–50)
GLOBULIN SER CALC-MCNC: 2.8 G/DL (ref 1.9–3.5)
GLUCOSE BLD-MCNC: 108 MG/DL (ref 65–99)
GLUCOSE SERPL-MCNC: 111 MG/DL (ref 65–99)
HCT VFR BLD AUTO: 47.8 % (ref 42–52)
HGB BLD-MCNC: 17.2 G/DL (ref 14–18)
IMM GRANULOCYTES # BLD AUTO: 0.06 K/UL (ref 0–0.11)
IMM GRANULOCYTES NFR BLD AUTO: 0.6 % (ref 0–0.9)
INR PPP: 0.95 (ref 0.87–1.13)
LYMPHOCYTES # BLD AUTO: 1.57 K/UL (ref 1–4.8)
LYMPHOCYTES NFR BLD: 16.5 % (ref 22–41)
MCH RBC QN AUTO: 31.7 PG (ref 27–33)
MCHC RBC AUTO-ENTMCNC: 36 G/DL (ref 33.7–35.3)
MCV RBC AUTO: 88.2 FL (ref 81.4–97.8)
MONOCYTES # BLD AUTO: 0.62 K/UL (ref 0–0.85)
MONOCYTES NFR BLD AUTO: 6.5 % (ref 0–13.4)
NEUTROPHILS # BLD AUTO: 7.2 K/UL (ref 1.82–7.42)
NEUTROPHILS NFR BLD: 75.8 % (ref 44–72)
NRBC # BLD AUTO: 0 K/UL
NRBC BLD-RTO: 0 /100 WBC
PLATELET # BLD AUTO: 222 K/UL (ref 164–446)
PMV BLD AUTO: 9.5 FL (ref 9–12.9)
POTASSIUM SERPL-SCNC: 3.9 MMOL/L (ref 3.6–5.5)
PROT SERPL-MCNC: 7.6 G/DL (ref 6–8.2)
PROTHROMBIN TIME: 13 SEC (ref 12–14.6)
RBC # BLD AUTO: 5.42 M/UL (ref 4.7–6.1)
RH BLD: NORMAL
SARS-COV-2 RNA RESP QL NAA+PROBE: NOTDETECTED
SODIUM SERPL-SCNC: 139 MMOL/L (ref 135–145)
SPECIMEN SOURCE: NORMAL
TROPONIN T SERPL-MCNC: 9 NG/L (ref 6–19)
WBC # BLD AUTO: 9.5 K/UL (ref 4.8–10.8)

## 2021-04-02 PROCEDURE — 99285 EMERGENCY DEPT VISIT HI MDM: CPT

## 2021-04-02 PROCEDURE — 85610 PROTHROMBIN TIME: CPT

## 2021-04-02 PROCEDURE — 82962 GLUCOSE BLOOD TEST: CPT

## 2021-04-02 PROCEDURE — 70450 CT HEAD/BRAIN W/O DYE: CPT

## 2021-04-02 PROCEDURE — 85730 THROMBOPLASTIN TIME PARTIAL: CPT

## 2021-04-02 PROCEDURE — U0005 INFEC AGEN DETEC AMPLI PROBE: HCPCS

## 2021-04-02 PROCEDURE — 700117 HCHG RX CONTRAST REV CODE 255: Performed by: EMERGENCY MEDICINE

## 2021-04-02 PROCEDURE — 99291 CRITICAL CARE FIRST HOUR: CPT | Performed by: PSYCHIATRY & NEUROLOGY

## 2021-04-02 PROCEDURE — 93306 TTE W/DOPPLER COMPLETE: CPT

## 2021-04-02 PROCEDURE — 99223 1ST HOSP IP/OBS HIGH 75: CPT | Performed by: STUDENT IN AN ORGANIZED HEALTH CARE EDUCATION/TRAINING PROGRAM

## 2021-04-02 PROCEDURE — 92610 EVALUATE SWALLOWING FUNCTION: CPT

## 2021-04-02 PROCEDURE — U0003 INFECTIOUS AGENT DETECTION BY NUCLEIC ACID (DNA OR RNA); SEVERE ACUTE RESPIRATORY SYNDROME CORONAVIRUS 2 (SARS-COV-2) (CORONAVIRUS DISEASE [COVID-19]), AMPLIFIED PROBE TECHNIQUE, MAKING USE OF HIGH THROUGHPUT TECHNOLOGIES AS DESCRIBED BY CMS-2020-01-R: HCPCS

## 2021-04-02 PROCEDURE — 700102 HCHG RX REV CODE 250 W/ 637 OVERRIDE(OP): Performed by: PSYCHIATRY & NEUROLOGY

## 2021-04-02 PROCEDURE — 70498 CT ANGIOGRAPHY NECK: CPT

## 2021-04-02 PROCEDURE — 84484 ASSAY OF TROPONIN QUANT: CPT

## 2021-04-02 PROCEDURE — 85025 COMPLETE CBC W/AUTO DIFF WBC: CPT

## 2021-04-02 PROCEDURE — 770020 HCHG ROOM/CARE - TELE (206)

## 2021-04-02 PROCEDURE — 0042T CT-CEREBRAL PERFUSION ANALYSIS: CPT

## 2021-04-02 PROCEDURE — 71045 X-RAY EXAM CHEST 1 VIEW: CPT

## 2021-04-02 PROCEDURE — 80053 COMPREHEN METABOLIC PANEL: CPT

## 2021-04-02 PROCEDURE — 86900 BLOOD TYPING SEROLOGIC ABO: CPT

## 2021-04-02 PROCEDURE — 93005 ELECTROCARDIOGRAM TRACING: CPT

## 2021-04-02 PROCEDURE — 99215 OFFICE O/P EST HI 40 MIN: CPT | Performed by: NURSE PRACTITIONER

## 2021-04-02 PROCEDURE — 93005 ELECTROCARDIOGRAM TRACING: CPT | Performed by: EMERGENCY MEDICINE

## 2021-04-02 PROCEDURE — 70496 CT ANGIOGRAPHY HEAD: CPT

## 2021-04-02 PROCEDURE — A9270 NON-COVERED ITEM OR SERVICE: HCPCS | Performed by: PSYCHIATRY & NEUROLOGY

## 2021-04-02 PROCEDURE — 86901 BLOOD TYPING SEROLOGIC RH(D): CPT

## 2021-04-02 PROCEDURE — 86850 RBC ANTIBODY SCREEN: CPT

## 2021-04-02 RX ORDER — POLYETHYLENE GLYCOL 3350 17 G/17G
1 POWDER, FOR SOLUTION ORAL
Status: DISCONTINUED | OUTPATIENT
Start: 2021-04-02 | End: 2021-04-03 | Stop reason: HOSPADM

## 2021-04-02 RX ORDER — AMOXICILLIN 250 MG
2 CAPSULE ORAL 2 TIMES DAILY
Status: DISCONTINUED | OUTPATIENT
Start: 2021-04-02 | End: 2021-04-03 | Stop reason: HOSPADM

## 2021-04-02 RX ORDER — IBUPROFEN 200 MG
600 TABLET ORAL
Status: ON HOLD | COMMUNITY
End: 2021-04-03

## 2021-04-02 RX ORDER — ONDANSETRON 2 MG/ML
4 INJECTION INTRAMUSCULAR; INTRAVENOUS EVERY 4 HOURS PRN
Status: DISCONTINUED | OUTPATIENT
Start: 2021-04-02 | End: 2021-04-03 | Stop reason: HOSPADM

## 2021-04-02 RX ORDER — PROMETHAZINE HYDROCHLORIDE 25 MG/1
12.5-25 SUPPOSITORY RECTAL EVERY 4 HOURS PRN
Status: DISCONTINUED | OUTPATIENT
Start: 2021-04-02 | End: 2021-04-03 | Stop reason: HOSPADM

## 2021-04-02 RX ORDER — ONDANSETRON 4 MG/1
4 TABLET, ORALLY DISINTEGRATING ORAL EVERY 4 HOURS PRN
Status: DISCONTINUED | OUTPATIENT
Start: 2021-04-02 | End: 2021-04-03 | Stop reason: HOSPADM

## 2021-04-02 RX ORDER — BISACODYL 10 MG
10 SUPPOSITORY, RECTAL RECTAL
Status: DISCONTINUED | OUTPATIENT
Start: 2021-04-02 | End: 2021-04-03 | Stop reason: HOSPADM

## 2021-04-02 RX ORDER — PROMETHAZINE HYDROCHLORIDE 25 MG/1
12.5-25 TABLET ORAL EVERY 4 HOURS PRN
Status: DISCONTINUED | OUTPATIENT
Start: 2021-04-02 | End: 2021-04-03 | Stop reason: HOSPADM

## 2021-04-02 RX ORDER — HYDRALAZINE HYDROCHLORIDE 20 MG/ML
10 INJECTION INTRAMUSCULAR; INTRAVENOUS
Status: DISCONTINUED | OUTPATIENT
Start: 2021-04-02 | End: 2021-04-03 | Stop reason: HOSPADM

## 2021-04-02 RX ORDER — ASPIRIN 81 MG/1
162 TABLET, CHEWABLE ORAL DAILY
Status: DISCONTINUED | OUTPATIENT
Start: 2021-04-02 | End: 2021-04-03

## 2021-04-02 RX ORDER — ACETAMINOPHEN 325 MG/1
650 TABLET ORAL EVERY 6 HOURS PRN
Status: DISCONTINUED | OUTPATIENT
Start: 2021-04-02 | End: 2021-04-03 | Stop reason: HOSPADM

## 2021-04-02 RX ORDER — LABETALOL HYDROCHLORIDE 5 MG/ML
10 INJECTION, SOLUTION INTRAVENOUS EVERY 4 HOURS PRN
Status: DISCONTINUED | OUTPATIENT
Start: 2021-04-02 | End: 2021-04-03 | Stop reason: HOSPADM

## 2021-04-02 RX ORDER — PROCHLORPERAZINE EDISYLATE 5 MG/ML
5-10 INJECTION INTRAMUSCULAR; INTRAVENOUS EVERY 4 HOURS PRN
Status: DISCONTINUED | OUTPATIENT
Start: 2021-04-02 | End: 2021-04-03 | Stop reason: HOSPADM

## 2021-04-02 RX ADMIN — ASPIRIN 162 MG: 81 TABLET, CHEWABLE ORAL at 14:30

## 2021-04-02 RX ADMIN — IOHEXOL 40 ML: 350 INJECTION, SOLUTION INTRAVENOUS at 13:53

## 2021-04-02 RX ADMIN — IOHEXOL 80 ML: 350 INJECTION, SOLUTION INTRAVENOUS at 13:54

## 2021-04-02 ASSESSMENT — ENCOUNTER SYMPTOMS
TREMORS: 0
NERVOUS/ANXIOUS: 0
FEVER: 0
NERVOUS/ANXIOUS: 0
DIAPHORESIS: 1
CONSTIPATION: 0
ORTHOPNEA: 0
BLURRED VISION: 1
SHORTNESS OF BREATH: 0
PHOTOPHOBIA: 0
MYALGIAS: 0
SINUS PAIN: 0
BLURRED VISION: 1
FEVER: 0
COUGH: 0
ABDOMINAL PAIN: 0
EYE PAIN: 0
NAUSEA: 1
FOCAL WEAKNESS: 0
RESPIRATORY NEGATIVE: 1
VOMITING: 1
FOCAL WEAKNESS: 0
SENSORY CHANGE: 1
DIZZINESS: 1
DOUBLE VISION: 1
DEPRESSION: 0
PALPITATIONS: 0
HEADACHES: 0
SPEECH CHANGE: 0
HEADACHES: 0
DIZZINESS: 1
NAUSEA: 1
CHILLS: 0
SHORTNESS OF BREATH: 0
SENSORY CHANGE: 0
TINGLING: 1
SPEECH CHANGE: 0
SORE THROAT: 0
DIARRHEA: 0
VOMITING: 1

## 2021-04-02 ASSESSMENT — COGNITIVE AND FUNCTIONAL STATUS - GENERAL
SUGGESTED CMS G CODE MODIFIER MOBILITY: CH
DAILY ACTIVITIY SCORE: 24
SUGGESTED CMS G CODE MODIFIER DAILY ACTIVITY: CH
MOBILITY SCORE: 24

## 2021-04-02 ASSESSMENT — LIFESTYLE VARIABLES
TOTAL SCORE: 1
TOTAL SCORE: 1
EVER HAD A DRINK FIRST THING IN THE MORNING TO STEADY YOUR NERVES TO GET RID OF A HANGOVER: NO
ON A TYPICAL DAY WHEN YOU DRINK ALCOHOL HOW MANY DRINKS DO YOU HAVE: 6
ALCOHOL_USE: YES
HOW MANY TIMES IN THE PAST YEAR HAVE YOU HAD 5 OR MORE DRINKS IN A DAY: 35
CONSUMPTION TOTAL: POSITIVE
EVER FELT BAD OR GUILTY ABOUT YOUR DRINKING: NO
HAVE PEOPLE ANNOYED YOU BY CRITICIZING YOUR DRINKING: NO
TOTAL SCORE: 1
SUBSTANCE_ABUSE: 0
HAVE YOU EVER FELT YOU SHOULD CUT DOWN ON YOUR DRINKING: YES
DOES PATIENT WANT TO STOP DRINKING: NO
AVERAGE NUMBER OF DAYS PER WEEK YOU HAVE A DRINK CONTAINING ALCOHOL: 1

## 2021-04-02 ASSESSMENT — PATIENT HEALTH QUESTIONNAIRE - PHQ9
1. LITTLE INTEREST OR PLEASURE IN DOING THINGS: NOT AT ALL
SUM OF ALL RESPONSES TO PHQ9 QUESTIONS 1 AND 2: 0
2. FEELING DOWN, DEPRESSED, IRRITABLE, OR HOPELESS: NOT AT ALL

## 2021-04-02 ASSESSMENT — VISUAL ACUITY: OU: 1

## 2021-04-02 ASSESSMENT — FIBROSIS 4 INDEX
FIB4 SCORE: 0.81
FIB4 SCORE: 0.61
FIB4 SCORE: 0.61
FIB4 SCORE: 0.81

## 2021-04-02 NOTE — ED TRIAGE NOTES
"Jhonny Snyder  45 y.o.  Chief Complaint   Patient presents with   • Sent from Urgent Care     EKG completed prior to triage per protocol.    Ambulatory to triage with steady gait for above. A & O x 4, GCS 15. Mask in place.    Onset: 1540 YESTERDAY    Symptoms: sudden onset dizzines, blurred vision, abnormal gait towards LEFT side, N/V  States \"I feel like I'm drunk but I haven't drank anything.\"    New symptom onset: 0800 today upon awakening - L lip tingling/numbness    States that he took Ibuprofen 600 mg PO last night.  Denies ASA/thinner.  Denies head trauma. Denies headache.    States 2-3 days ago he had R chest pain radiating across chest described as \"a tight band\" - resolved without intervention.    Charge YONI Patton notified of patient. Stroke IR called per protocol. Patient from triage directly to Charge Desk.  "

## 2021-04-02 NOTE — PROGRESS NOTES
1535: Received report from [facility]    1600: Pt retrieved from ED transported via gurney on zoll monitor by this RN and now in unit. Alert and Oriented x4, denies pain, reporting numbness on left side of lips, denies numbness/tingling elsewhere. Reporting mild dizziness. Pt ambulated with steady gait 20 feet stand by assist with no increase in dizziness. Dysphagia screen failed, notified admitting MD Yun, pt NPO with SLP eval ordered. LUE ataxia noted, strengths 5/5 in all extremities. Bed alarm on, nonskid socks on, bed low and locked, educated about fall risk and use of call light.

## 2021-04-02 NOTE — CONSULTS
Neurology STROKE CODE H&P  Neurohospitalist Service, [facility]    Referring Physician: Mary Beth Reyes M.D.    STROKE CODE:   Chief Complaint   Patient presents with   • Sent from Urgent Care       To obtain the most accurate data regarding the time called, and time patient seen, refer to the stroke run-sheet and chart.  For time of CT, refer to the radiology report. See A&P below for TPA Decision and door to needle time if and when applicable.    HPI: Jhonny Snyder is a 45 year old man with history of gout presenting with vertigo and left lower lip paresthesias.  He reports that symptoms started around 340PM yesterday while he was driving.  The vertigo is described as sensation the scene is spinning.  There is associated nausea and vomiting.  He reports that the vertigo improves if he lays still, and triggered with motion.   He feels that his left side of his lips feels tingling, but this has slowly improved.  On arrival to ER, his CTH was negative for hemorrhage, his CT angiogram revealed a patent vertebrobasilar system.  CTP suggestive of evolving L cerebellar infarct.  On ROS, he reports distant smoking history, occasional EtOH, denies illicit drug use.  No infectious prodrome, no constitutional symptoms.    Review of systems: In addition to what is detailed in the HPI above, all other systems reviewed and are negative.    Past Medical History:   Gout    FHx:  family history includes Cancer in his mother; Diabetes in his father; Hypertension in his father.    SHx:   reports that he quit smoking about 5 years ago. His smoking use included cigarettes. He has a 12.50 pack-year smoking history. He has never used smokeless tobacco. He reports current alcohol use. He reports that he does not use drugs.    Allergies:  No Known Allergies    Medications:  No current facility-administered medications for this encounter.    Current Outpatient Medications:   •  ibuprofen (MOTRIN)  "200 MG Tab, Take 200 mg by mouth every 6 hours as needed., Disp: , Rfl:   •  oxyCODONE-acetaminophen (PERCOCET-10)  MG Tab, Take 1-2 Tabs by mouth every four hours as needed for Severe Pain., Disp: , Rfl:   •  indomethacin (INDOCIN) 50 MG Cap, Take 1 Cap by mouth 2 times a day as needed. (Patient not taking: Reported on 9/17/2020), Disp: 30 Cap, Rfl: 1  •  allopurinol (ZYLOPRIM) 300 MG Tab, allopurinol 300 mg tablet (Patient not taking: Reported on 9/17/2020), Disp: 90 Tab, Rfl: 2  •  colchicine (COLCRYS) 0.6 MG Tab, Take 2 tablets one time and then 1 tablet of the 1 hour.  Repeat the same after 3 days if no relief. (Patient not taking: Reported on 9/17/2020), Disp: 10 Tab, Rfl: 3    Physical Examination:    Vitals:    04/02/21 1259 04/02/21 1312   BP: 158/110    Pulse: 80    Resp: 20    Temp: 36.7 °C (98 °F)    TempSrc: Temporal    SpO2: 97%    Weight:  86.5 kg (190 lb 11.2 oz)   Height: 1.753 m (5' 9\")      NEUROLOGICAL EXAM:     Mental status: Awake, alert and fully oriented, follows commands  Speech and language: Speech is not dysarthric. The patient is able to name and repeat.  Cranial nerve exam: Visual fields are full.  Extraocular muscles are intact.  There is end-gaze evoked nystagmus. Sensation in the face is intact to light touch. Face is symmetric.  Motor exam: Sustained antigravity in all 4 extremities, without downward drift. Tone is normal. No abnormal movements were seen on exam.  Sensory exam: No sensory deficits identified   Coordination: No ataxia noted on bilateral FTN testing    NIH Stroke Scale:    1a. Level of Consciousness (Alert, drowsy, etc): 0= Alert     1b. LOC Questions (Month, age): 0= Answers both correctly    1c. LOC Commands (Open/close eyes make fist/let go): 0= Obeys both correctly    2.   Best Gaze (Eyes open - patient follows examiner's finger on face): 0= Normal    3.   Visual Fields (introduce visual stimulus/threat to patient's field quadrants): 0= No visual loss  4.   " Facial Paresis (Show teeth, raise eyebrows and squeeze eyes shut): 0= Normal     5a. Motor Arm - Left (Elevate arm to 90 degrees if patient is sitting, 45 degrees if  supine): 0= No drift    5b. Motor Arm - Right (Elevate arm to 90 degrees if patient is sitting, 45 degrees if supine): 0= No drift    6a. Motor Leg - Left (Elevate leg 30 degrees with patient supine): 0= No drift    6b. Motor Leg - Right  (Elevate leg 30 degrees with patient supine): 0= No drift    7.   Limb Ataxia (Finger-nose, heel down shin): 0= No ataxia    8.   Sensory (Pin prick to face, arm, trunk and leg - compare side to side): 0= Normal    9.  Best Language (Name item, describe a picture and read sentences): 0= No aphasia    10. Dysarthria (Evaluate speech clarity by patient repeating listed words): 0= Normal articulation    11. Extinction and Inattention (Use information from prior testing to identify neglect or  double simultaneous stimuli testing): 0= No neglect    Total NIH Score: 0    Baseline Modified [place] Scale (MRS): 0 = No symptoms    Objective Data:    Labs:  No results found for: PROTHROMBTM, INR   Lab Results   Component Value Date/Time    WBC 4.3 06/11/2019 07:28 AM    RBC 4.92 06/11/2019 07:28 AM    HEMOGLOBIN 14.7 06/11/2019 07:28 AM    HEMATOCRIT 43.7 06/11/2019 07:28 AM    MCV 89 06/11/2019 07:28 AM    MCH 29.9 06/11/2019 07:28 AM    MCHC 33.6 06/11/2019 07:28 AM    NEUTSPOLYS 54 06/11/2019 07:28 AM    LYMPHOCYTES 32 06/11/2019 07:28 AM    MONOCYTES 10 06/11/2019 07:28 AM    EOSINOPHILS 3 06/11/2019 07:28 AM    BASOPHILS 1 06/11/2019 07:28 AM      Lab Results   Component Value Date/Time    SODIUM 143 06/11/2019 07:28 AM    POTASSIUM 4.6 06/11/2019 07:28 AM    CHLORIDE 106 06/11/2019 07:28 AM    CO2 24 06/11/2019 07:28 AM    GLUCOSE 97 06/11/2019 07:28 AM    BUN 12 06/11/2019 07:28 AM    CREATININE 0.97 06/11/2019 07:28 AM    BUNCREATRAT 12 06/11/2019 07:28 AM      Lab Results   Component Value Date/Time    CHOLSTRLTOT 128  06/11/2019 07:28 AM    LDL 68 06/11/2019 07:28 AM    HDL 45 06/11/2019 07:28 AM    TRIGLYCERIDE 77 06/11/2019 07:28 AM       Lab Results   Component Value Date/Time    ALKPHOSPHAT 72 06/11/2019 07:28 AM    ASTSGOT 15 06/11/2019 07:28 AM    ALTSGPT 16 06/11/2019 07:28 AM    TBILIRUBIN 0.5 06/11/2019 07:28 AM        Imaging/Testing:    I interpreted and/or reviewed the patient's neuroimaging    CT-CTA NECK WITH & W/O-POST PROCESSING   Final Result      1.  No evidence of carotid or vertebral arterial occlusion.      2.  Diminutive right vertebral artery.      3.  2 cm right-sided thyroid nodule.      CT-CTA HEAD WITH & W/O-POST PROCESS   Final Result      1.  No large vessel occlusion is identified.      2.  Small right vertebral artery with termination as the posterior inferior cerebellar artery.      3.  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  Cerebral blood flow less than 30% likely representing completed infarct = 0 mL.      2.  T Max more than 6 seconds likely representing combination of completed infarct and ischemia = 9 mL.      3.  Mismatched volume likely representing ischemic brain/penumbra = 9 mL      4.  Please note that the cerebral perfusion was performed on the limited brain tissue around the basal ganglia region. Infarct/ischemia outside the CT perfusion sections can be missed in this study.      CT-HEAD W/O   Final Result      No evidence of acute intracranial process.      DX-CHEST-PORTABLE (1 VIEW)    (Results Pending)       Assessment and Plan:    Jhonny Snyder is a 45 year-old presenting with episodic vertigo and left lower lip paresethsias.  Clinical semiology of his vertigo more suggestive of a peripheral vertigo, however CT perfusion is suggestive of possible L cerebellar infarct.  Will give ASA 162mg now, admit for MRI.  Given length of symptoms, if vertigo is related to central  ischemia, MRI will be able to detect it.  If MRI negative for stroke, no indication to initiate primary stroke prevention given demographics and lack of vascular risk factors.  IV-tPA not given as he is well outside the therapeutic window.  No target for endovascular clot retrieval.    Plan:   - q4h neurochecks ok on admission   - MRI brain without contrast   - give ASA 162mg in ER (ordered)   - allow permissive HTN given identified hypoperfusion on CTP- long-term goal is 110-130/60-80   - if MRI positive for ischemia will complete embolic and hypercoagulable workup   - if MRI negative for ischemia, then his clinical presentation is not cause by CNS ischemia- no clinical indication to initiate primary stroke prevention   - 2mm JUDY aneurysm does not warrant urgent intervention- may follow up in outpatient with vascular neurosurgery for surveillance        Upon my evaluation, this patient had a high probability of imminent or life-threatening deterioration due to cerebrovascular accident which required my direct attention, intervention, and personal management.  I personally provided 60 minutes of total critical care time outside of time spent on separately billable/documented procedures. Time includes: review of laboratory data, review of radiology studies, discussion with consultants, discussion with family/patient, monitoring for potential decompensation.  Interventions were performed as documented in the chart.    Seng Mai MD  Neurohospitalist, [facility]

## 2021-04-02 NOTE — H&P
Hospital Medicine History & Physical Note    Date of Service  4/2/2021    Primary Care Physician  Pcp Pt States None    Consultants  Neurology-Dr. Mai    Code Status  Full Code    Chief Complaint  Chief Complaint   Patient presents with   • Sent from Urgent Care       History of Presenting Illness  45 y.o. male with past medical history of gout who presented 4/2/2021 with symptoms of dizziness and ataxia that started acutely yesterday.  Patient states that he felt like his vision got blurry, and he could not walk straight, he also developed nausea and vomiting and some numbness to his left upper lip.  His symptoms improved but did not resolve thus he presented to the hospital today for further evaluation.  He states that he has never had symptoms like this before, he denies headache, ear pain or tinnitus, denies congestion or sore throat.  He denies any focal weakness.     Here in the ER, temperature is 98, heart rate 80, respiratory rate 20, blood pressure 158/110, saturating 97% on room air.   Labs are unremarkable aside from a glucose of 111.     CT head was negative for hemorrhage, CT perfusion study did reveal evolving left cerebellar infarct, CT angiogram showed small right vertebral artery with termination at the posterior inferior cerebellar artery and a 2 mm aneurysm arising from the A3 segment of the right anterior cerebral artery, no large vessel occlusion was seen    Neurology was consulted and patient will be admitted for stroke work-up and MRI.  Patient did not receive TPA as he was outside of window.     Review of Systems  Review of Systems   Constitutional: Negative for chills, fever and malaise/fatigue.   HENT: Negative for congestion, ear pain, sinus pain, sore throat and tinnitus.    Eyes: Positive for blurred vision and double vision. Negative for photophobia and pain.   Respiratory: Negative for cough and shortness of breath.    Cardiovascular: Positive for chest pain (2 to 3 days ago now  resolved). Negative for orthopnea and leg swelling.   Gastrointestinal: Positive for nausea and vomiting. Negative for abdominal pain, constipation and diarrhea.   Genitourinary: Negative for dysuria and urgency.   Musculoskeletal: Negative for myalgias.   Skin: Positive for rash (Chronic heat rash).   Neurological: Positive for dizziness and tingling (Left upper lip). Negative for tremors, sensory change, speech change, focal weakness and headaches.        Disequilibrium   Psychiatric/Behavioral: Negative for depression and substance abuse. The patient is not nervous/anxious.        Past Medical History   has a past medical history of Gout.    Surgical History   has a past surgical history that includes hand surgery (Right).     Family History  family history includes Cancer in his mother; Diabetes in his father; Hypertension in his father.     Social History   reports that he quit smoking about 5 years ago. His smoking use included cigarettes. He has a 12.50 pack-year smoking history. He quit smokeless tobacco use about 3 years ago. He reports current alcohol use. He reports that he does not use drugs.    Allergies  No Known Allergies    Medications  Prior to Admission Medications   Prescriptions Last Dose Informant Patient Reported? Taking?   allopurinol (ZYLOPRIM) 300 MG Tab Not Taking at Unknown time Patient No No   Sig: allopurinol 300 mg tablet   Patient not taking: Reported on 4/2/2021   colchicine (COLCRYS) 0.6 MG Tab Not Taking at Unknown time Patient No No   Sig: Take 2 tablets one time and then 1 tablet of the 1 hour.  Repeat the same after 3 days if no relief.   Patient not taking: Reported on 4/2/2021   ibuprofen (MOTRIN) 200 MG Tab 4/1/2021 at PM Patient Yes No   Sig: Take 600 mg by mouth one time as needed.   indomethacin (INDOCIN) 50 MG Cap Not Taking at Unknown time Patient No No   Sig: Take 1 Cap by mouth 2 times a day as needed.   Patient not taking: Reported on 4/2/2021       Facility-Administered Medications: None       Physical Exam  Temp:  [36.7 °C (98 °F)-36.8 °C (98.3 °F)] 36.8 °C (98.3 °F)  Pulse:  [64-82] 64  Resp:  [17-20] 17  BP: (141-176)/() 141/96  SpO2:  [97 %] 97 %    Physical Exam  Vitals and nursing note reviewed.   Constitutional:       General: He is not in acute distress.     Appearance: Normal appearance. He is normal weight. He is not ill-appearing.   HENT:      Head: Normocephalic and atraumatic.      Mouth/Throat:      Mouth: Mucous membranes are moist.      Pharynx: Oropharynx is clear.   Eyes:      Extraocular Movements: Extraocular movements intact.      Conjunctiva/sclera: Conjunctivae normal.      Pupils: Pupils are equal, round, and reactive to light.   Cardiovascular:      Rate and Rhythm: Normal rate and regular rhythm.      Pulses: Normal pulses.      Heart sounds: No murmur.   Pulmonary:      Effort: Pulmonary effort is normal. No respiratory distress.      Breath sounds: Normal breath sounds. No wheezing or rales.   Abdominal:      General: Bowel sounds are normal. There is no distension.      Palpations: Abdomen is soft.   Musculoskeletal:         General: Normal range of motion.      Cervical back: Normal range of motion.      Right lower leg: No edema.      Left lower leg: No edema.   Skin:     General: Skin is warm and dry.      Capillary Refill: Capillary refill takes less than 2 seconds.   Neurological:      Mental Status: He is alert and oriented to person, place, and time.      Cranial Nerves: No cranial nerve deficit.      Sensory: No sensory deficit.      Motor: No weakness.      Coordination: Coordination abnormal.      Comments: Gaze evoked nystagmus   Psychiatric:         Mood and Affect: Mood normal.         Behavior: Behavior normal.         Thought Content: Thought content normal.         Judgment: Judgment normal.         Laboratory:  Recent Labs     04/02/21  1334   WBC 9.5   RBC 5.42   HEMOGLOBIN 17.2   HEMATOCRIT 47.8   MCV  88.2   MCH 31.7   MCHC 36.0*   RDW 38.8   PLATELETCT 222   MPV 9.5     Recent Labs     04/02/21  1334   SODIUM 139   POTASSIUM 3.9   CHLORIDE 106   CO2 24   GLUCOSE 111*   BUN 9   CREATININE 0.86   CALCIUM 9.4     Recent Labs     04/02/21  1334   ALTSGPT 14   ASTSGOT 11*   ALKPHOSPHAT 69   TBILIRUBIN 0.7   GLUCOSE 111*     Recent Labs     04/02/21  1334   APTT 25.9   INR 0.95     No results for input(s): NTPROBNP in the last 72 hours.      Recent Labs     04/02/21  1334   TROPONINT 9       Imaging:  DX-CHEST-PORTABLE (1 VIEW)   Final Result      No acute cardiopulmonary findings.      CT-CTA NECK WITH & W/O-POST PROCESSING   Final Result      1.  No evidence of carotid or vertebral arterial occlusion.      2.  Diminutive right vertebral artery.      3.  2 cm right-sided thyroid nodule.      CT-CTA HEAD WITH & W/O-POST PROCESS   Final Result      1.  No large vessel occlusion is identified.      2.  Small right vertebral artery with termination as the posterior inferior cerebellar artery.      3.  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  Cerebral blood flow less than 30% likely representing completed infarct = 0 mL.      2.  T Max more than 6 seconds likely representing combination of completed infarct and ischemia = 9 mL.      3.  Mismatched volume likely representing ischemic brain/penumbra = 9 mL      4.  Please note that the cerebral perfusion was performed on the limited brain tissue around the basal ganglia region. Infarct/ischemia outside the CT perfusion sections can be missed in this study.      CT-HEAD W/O   Final Result      No evidence of acute intracranial process.      MR-BRAIN-WITH & W/O    (Results Pending)   EC-ECHOCARDIOGRAM COMPLETE W/O CONT    (Results Pending)         Assessment/Plan:  I anticipate this patient is appropriate for observation status at this time.    * Cerebellar stroke (HCC)-  (present on admission)  Assessment & Plan  Patient presents with symptoms of blurred vision and disequilibrium that occurred yesterday on 4/1/2021  -CT head shows no hemorrhage, CT perfusion does show a mismatched area in the left cerebellum concerning for evolving ischemic infarct  -Neurology consulted, appreciate their help with management recommendations  -MRI brain ordered  -Aspirin initiated  -N.p.o. 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

## 2021-04-02 NOTE — ASSESSMENT & PLAN NOTE
Not currently active, no treatment indicated at this time  Patient will require close follow-up as an outpatient to see if allopurinol would be indicated in his case.

## 2021-04-02 NOTE — PATIENT INSTRUCTIONS
Patient referred to the ER for further evaluation of the following acute symptoms:    1. Dizziness    2. Numbness of lip    3. Chest pain, unspecified type    4. Nausea and vomiting, intractability of vomiting not specified, unspecified vomiting type

## 2021-04-02 NOTE — ASSESSMENT & PLAN NOTE
Incidental 2 mm aneurysm arising from the A3 segment of the right anterior cerebral artery  -Neurology recommending outpatient surveillance with neurovascular surgery, no emergent intervention is needed

## 2021-04-02 NOTE — ED NOTES
Pt from CT to Red 10. Neurologist at bedside. Report received from RN. Pt updated on POC and verbalized understanding. Call light within reach

## 2021-04-02 NOTE — ED PROVIDER NOTES
ED Provider Note    Chief Complaint:   Disequilibrium    HPI:  Jhonny Snyder is a very pleasant 45-year-old gentleman who presents for evaluation of an episode of disequilibrium, facial paresthesias, and nausea.  His symptoms began around 330 yesterday afternoon, about 20 hours prior to arrival.  He reports a rapid onset of symptoms of disequilibrium, described as a listing sensation when trying to walk, he also had sensations of vertigo, and some vision changes described as diplopia and blurred vision.  He also describes sensation of vertigo with room spinning.  He took some ibuprofen, went to bed last night, when he woke up this morning he reported some tingling and numbness to the left side of his lip.  He also reports as though he had some difficulty drinking and moving objects towards his face, as he feels as though his alignment is off and he is not easily able to bring food or drinks directly to his lips.  On review of systems, he reports that he did have some chest pressure 3 days ago with taking a deep breath, he has not had any chest pain, nor thoracic back pain over the past 24 hours, he did not have any chest pain or back pain at the time of symptom onset.  He is unable to identify any exacerbating or alleviating factors, symptoms do seem improved today from yesterday, though he still describes a sensation of disequilibrium and difficulty moving food and drink to his mouth.    He reports no personal history of diabetes or hyperlipidemia, he states he has been told his blood pressure has been high at times, but he has never been started on any antihypertensive medications.  With regard to his family history, he states that he does have a cousin who had a stroke recently, the cousin is 47 years old.  He does not have any history of early cardiac disease or stroke within his immediate family.    Review of Systems:  See HPI for pertinent positives and negatives. All other systems  "negative.    Past Medical History:       Social History:  Social History     Tobacco Use   • Smoking status: Former Smoker     Packs/day: 0.50     Years: 25.00     Pack years: 12.50     Types: Cigarettes     Quit date: 2016     Years since quittin.2   • Smokeless tobacco: Never Used   • Tobacco comment: Started in late 30's 1PP/ 2 days.   Substance and Sexual Activity   • Alcohol use: Yes     Comment: occasional   • Drug use: No   • Sexual activity: Not Currently     Comment: Single , work as Consumer chair.       Surgical History:   has a past surgical history that includes hand surgery (Right).    Current Medications:  Home Medications     Reviewed by Didi Medrano R.N. (Registered Nurse) on 21 at 1322  Med List Status: Partial   Medication Last Dose Status   allopurinol (ZYLOPRIM) 300 MG Tab  Active   colchicine (COLCRYS) 0.6 MG Tab  Active   ibuprofen (MOTRIN) 200 MG Tab  Active   indomethacin (INDOCIN) 50 MG Cap  Active   oxyCODONE-acetaminophen (PERCOCET-10)  MG Tab not taking Active                Allergies:  No Known Allergies    Physical Exam:  Vital Signs: /110   Pulse 80   Temp 36.7 °C (98 °F) (Temporal)   Resp 20   Ht 1.753 m (5' 9\")   Wt 86.5 kg (190 lb 11.2 oz)   SpO2 97%   BMI 28.16 kg/m²   Constitutional: Alert, no acute distress  HENT: Normocephalic, mask in place  Eyes: Pupils equal and reactive, normal conjunctiva  Neck: Supple, normal range of motion, no stridor  Cardiovascular: Extremities are warm and well perfused, no murmur appreciated, normal cardiac auscultation  Pulmonary: No respiratory distress, normal work of breathing, no accessory muscule usage, breath sounds clear and equal bilaterally  Abdomen: Soft, non-distended, non-tender to palpation, no peritoneal signs  Skin: Warm, dry, no rashes or lesions  Musculoskeletal: Normal range of motion in all extremities, no swelling or deformity noted  Neurologic: CN II-XII intact, speech normal, muscle strength " 5/5 in all four extremities, sensation grossly intact, difficulty with finger-to-nose testing, touches the side of his face rather than his nose, no pronator drift, NIH stroke scale is 0  Psychiatric: Normal and appropriate mood and affect    Medical records reviewed for continuity of care.  I reviewed his urgent care note from earlier today.  He was seen and evaluated for episodes of vertigo, nausea and vomiting.  As they were unable to obtain any type of rapid outpatient imaging, he was sent to the emergency department for further evaluation.    EKG: Rate 72, normal sinus rhythm, no ST elevation or depression, no ectopy    Labs:  Labs Reviewed   CBC WITH DIFFERENTIAL - Abnormal; Notable for the following components:       Result Value    MCHC 36.0 (*)     Neutrophils-Polys 75.80 (*)     Lymphocytes 16.50 (*)     All other components within normal limits    Narrative:     Indicate which anticoagulants the patient is on:->UNKNOWN   COMP METABOLIC PANEL - Abnormal; Notable for the following components:    Glucose 111 (*)     AST(SGOT) 11 (*)     All other components within normal limits    Narrative:     Indicate which anticoagulants the patient is on:->UNKNOWN   ACCU-CHEK GLUCOSE - Abnormal; Notable for the following components:    Glucose - Accu-Ck 108 (*)     All other components within normal limits   PROTHROMBIN TIME    Narrative:     Indicate which anticoagulants the patient is on:->UNKNOWN   APTT    Narrative:     Indicate which anticoagulants the patient is on:->UNKNOWN   COD (ADULT)   TROPONIN    Narrative:     Indicate which anticoagulants the patient is on:->UNKNOWN   ESTIMATED GFR    Narrative:     Indicate which anticoagulants the patient is on:->UNKNOWN   ABO RH CONFIRM       Radiology:  CT-CTA NECK WITH & W/O-POST PROCESSING   Final Result      1.  No evidence of carotid or vertebral arterial occlusion.      2.  Diminutive right vertebral artery.      3.  2 cm right-sided thyroid nodule.      CT-CTA  HEAD WITH & W/O-POST PROCESS   Final Result      1.  No large vessel occlusion is identified.      2.  Small right vertebral artery with termination as the posterior inferior cerebellar artery.      3.  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  Cerebral blood flow less than 30% likely representing completed infarct = 0 mL.      2.  T Max more than 6 seconds likely representing combination of completed infarct and ischemia = 9 mL.      3.  Mismatched volume likely representing ischemic brain/penumbra = 9 mL      4.  Please note that the cerebral perfusion was performed on the limited brain tissue around the basal ganglia region. Infarct/ischemia outside the CT perfusion sections can be missed in this study.      CT-HEAD W/O   Final Result      No evidence of acute intracranial process.      DX-CHEST-PORTABLE (1 VIEW)    (Results Pending)        ED Medications Administered:  Medications   aspirin (ASA) chewable tab 162 mg (162 mg Oral Given 4/2/21 1430)   iohexol (OMNIPAQUE) 350 mg/mL (40 mL Intravenous Given 4/2/21 1353)   iohexol (OMNIPAQUE) 350 mg/mL (80 mL Intravenous Given 4/2/21 1354)       Differential diagnosis:  Posterior circulation CVA, cerebellar infarct, peripheral vertigo, Ménière's disease, acoustic neuroma    MDM:  Mr. Snyder presents today with less than 24 hours of symptoms of vertigo, disequilibrium, and ataxia.  Symptoms have been improving, however he still has difficulty with finger-to-nose testing.  Otherwise, he has no focal neurologic deficits, and stroke score is 0, though NIH stroke scale is less sensitive with detecting cerebellar CVA.  He is well outside of the window for alteplase.    Lab work was reassuring.  CTA is negative for large vessel occlusion.  CT perfusion study demonstrates a mismatch in the cerebellum, concerning for cerebellar infarct.    Plan at this time is for admission  for MRI, and further evaluation regarding possible CVA.  He was treated with aspirin in the emergency department, and seen by our stroke neurologist.      Personal protective equipment including N95 surgical respirator, goggles, and gloves were used during this encounter.       Disposition:  Admit to hospitalist in stable condition    Final Impression:  1. Cerebrovascular accident (CVA), unspecified mechanism (HCC)    2. Disequilibrium        Electronically signed by: Mary Beth Reyes MD, 4/2/2021 9:47 PM

## 2021-04-02 NOTE — ED NOTES
Med rec complete via interview with pt at bedside. Pt denies taking nay prescription medications. Allergies reviewed. Pt denies antibiotic use in past 14 days.

## 2021-04-02 NOTE — PROGRESS NOTES
Subjective:     Jhonny Snyder is a 45 y.o. male who presents for Dizziness (started yesterday along with the following symptoms: sweating. vomiting, chest pain 3 days ago, left side of lip numbness)       Dizziness  This is a new problem. The problem has been gradually worsening. Associated symptoms include chest pain, diaphoresis, nausea and vomiting. Pertinent negatives include no fever or headaches. He has tried nothing for the symptoms.      Patient reports that 3 days ago, he started to experience right lateral chest pressure.  That resolved.  However, yesterday he started to experience dizziness, sweating, nausea, and vomiting.  Also experiencing left lip numbness and blurry vision.  Reports the dizziness as a sensation that he is drunk.  Drinks on occasion, but not excessively.  Dizziness seems to worsen with movement of his head.  Reports a spinning sensation.  Denies history of similar symptoms.    Patient was screened prior to rooming and denied COVID-19 diagnosis or contact with a person who has been diagnosed or is suspected to have COVID-19. During this visit, appropriate PPE was worn, hand hygiene was performed, and the patient and any visitors were masked.     PMH:  has no past medical history on file.    MEDS:   Current Outpatient Medications:   •  ibuprofen (MOTRIN) 200 MG Tab, Take 200 mg by mouth every 6 hours as needed., Disp: , Rfl:   •  oxyCODONE-acetaminophen (PERCOCET-10)  MG Tab, Take 1-2 Tabs by mouth every four hours as needed for Severe Pain., Disp: , Rfl:   •  indomethacin (INDOCIN) 50 MG Cap, Take 1 Cap by mouth 2 times a day as needed. (Patient not taking: Reported on 9/17/2020), Disp: 30 Cap, Rfl: 1  •  allopurinol (ZYLOPRIM) 300 MG Tab, allopurinol 300 mg tablet (Patient not taking: Reported on 9/17/2020), Disp: 90 Tab, Rfl: 2  •  colchicine (COLCRYS) 0.6 MG Tab, Take 2 tablets one time and then 1 tablet of the 1 hour.  Repeat the same after 3 days if no relief. (Patient  "not taking: Reported on 9/17/2020), Disp: 10 Tab, Rfl: 3    ALLERGIES: No Known Allergies    SURGHX: History reviewed. No pertinent surgical history.    SOCHX:  reports that he quit smoking about 5 years ago. His smoking use included cigarettes. He has a 12.50 pack-year smoking history. He has never used smokeless tobacco. He reports previous alcohol use. He reports that he does not use drugs.     FH: Reviewed with patient, not pertinent to this visit.    Review of Systems   Constitutional: Positive for diaphoresis. Negative for fever and malaise/fatigue.   Eyes: Positive for blurred vision.   Respiratory: Negative.  Negative for shortness of breath.    Cardiovascular: Positive for chest pain. Negative for palpitations.   Gastrointestinal: Positive for nausea and vomiting.   Neurological: Positive for dizziness and sensory change. Negative for speech change, focal weakness and headaches.   Psychiatric/Behavioral: The patient is not nervous/anxious.    All other systems reviewed and are negative.    Additional details per HPI.      Objective:     /100 (BP Location: Right arm, Patient Position: Sitting, BP Cuff Size: Adult)   Pulse 93   Temp 36.6 °C (97.9 °F) (Temporal)   Ht 1.753 m (5' 9\")   Wt 81.6 kg (180 lb)   SpO2 95%   BMI 26.58 kg/m²     Physical Exam  Vitals reviewed.   Constitutional:       General: He is not in acute distress.     Appearance: He is well-developed. He is not ill-appearing or toxic-appearing.   HENT:      Head: Normocephalic and atraumatic.      Right Ear: Tympanic membrane and external ear normal.      Left Ear: Tympanic membrane and external ear normal.      Nose: Nose normal.      Mouth/Throat:      Lips: Pink. No lesions.      Mouth: Mucous membranes are moist.      Pharynx: Oropharynx is clear. Uvula midline.   Eyes:      General: Vision grossly intact.      Extraocular Movements: Extraocular movements intact.      Conjunctiva/sclera: Conjunctivae normal.      Pupils: Pupils " are equal, round, and reactive to light.   Cardiovascular:      Rate and Rhythm: Normal rate and regular rhythm.      Heart sounds: Normal heart sounds.   Pulmonary:      Effort: Pulmonary effort is normal. No respiratory distress.      Breath sounds: Normal breath sounds. No decreased breath sounds, wheezing, rhonchi or rales.   Musculoskeletal:         General: No deformity. Normal range of motion.      Cervical back: Normal range of motion.   Skin:     General: Skin is warm and dry.      Coloration: Skin is not pale.   Neurological:      Mental Status: He is alert and oriented to person, place, and time.      GCS: GCS eye subscore is 4. GCS verbal subscore is 5. GCS motor subscore is 6.      Cranial Nerves: No dysarthria or facial asymmetry.      Sensory: No sensory deficit.      Motor: No weakness.      Coordination: Coordination normal.      Gait: Gait normal.   Psychiatric:         Behavior: Behavior normal. Behavior is cooperative.       Assessment/Plan:     1. Dizziness    2. Numbness of lip    3. Chest pain, unspecified type    4. Nausea and vomiting, intractability of vomiting not specified, unspecified vomiting type    New symptoms. Symptoms could be related to vertigo, but cannot make that determination without further diagnostics to rule out an acute process. Patient had chest pain 3 days ago. Has left lip numbness.    Recommend CT head to evaluate intracranial abnormality. Unfortunately, his medical insurance does not cover advanced outpatient imaging. Also recommend labs including troponin as well as EKG.    Patient referred to the ER for further evaluation and treatment. Patient agreeable and is going to the ER now.    Differential diagnosis, natural history, supportive care, over-the-counter symptom management per 's instructions, close monitoring, and indications for immediate follow-up discussed.     All questions answered. Patient agrees with the plan of care.

## 2021-04-03 ENCOUNTER — APPOINTMENT (OUTPATIENT)
Dept: RADIOLOGY | Facility: MEDICAL CENTER | Age: 46
DRG: 149 | End: 2021-04-03
Attending: PSYCHIATRY & NEUROLOGY
Payer: COMMERCIAL

## 2021-04-03 VITALS
WEIGHT: 190.48 LBS | DIASTOLIC BLOOD PRESSURE: 74 MMHG | HEIGHT: 69 IN | BODY MASS INDEX: 28.21 KG/M2 | HEART RATE: 70 BPM | TEMPERATURE: 97.5 F | SYSTOLIC BLOOD PRESSURE: 113 MMHG | OXYGEN SATURATION: 96 % | RESPIRATION RATE: 18 BRPM

## 2021-04-03 PROBLEM — H81.10 BPPV (BENIGN PAROXYSMAL POSITIONAL VERTIGO): Status: ACTIVE | Noted: 2021-04-03

## 2021-04-03 PROBLEM — I63.9 CEREBELLAR STROKE (HCC): Status: RESOLVED | Noted: 2021-04-02 | Resolved: 2021-04-03

## 2021-04-03 LAB
ABO + RH BLD: NORMAL
CHOLEST SERPL-MCNC: 142 MG/DL (ref 100–199)
HDLC SERPL-MCNC: 53 MG/DL
LDLC SERPL CALC-MCNC: 70 MG/DL
LV EJECT FRACT MOD 2C 99903: 64.15
LV EJECT FRACT MOD 4C 99902: 73.07
LV EJECT FRACT MOD BP 99901: 68.64
TRIGL SERPL-MCNC: 93 MG/DL (ref 0–149)

## 2021-04-03 PROCEDURE — 99232 SBSQ HOSP IP/OBS MODERATE 35: CPT | Performed by: PSYCHIATRY & NEUROLOGY

## 2021-04-03 PROCEDURE — 36415 COLL VENOUS BLD VENIPUNCTURE: CPT

## 2021-04-03 PROCEDURE — 93306 TTE W/DOPPLER COMPLETE: CPT | Mod: 26 | Performed by: STUDENT IN AN ORGANIZED HEALTH CARE EDUCATION/TRAINING PROGRAM

## 2021-04-03 PROCEDURE — 700102 HCHG RX REV CODE 250 W/ 637 OVERRIDE(OP): Performed by: PSYCHIATRY & NEUROLOGY

## 2021-04-03 PROCEDURE — 700102 HCHG RX REV CODE 250 W/ 637 OVERRIDE(OP): Performed by: STUDENT IN AN ORGANIZED HEALTH CARE EDUCATION/TRAINING PROGRAM

## 2021-04-03 PROCEDURE — A9270 NON-COVERED ITEM OR SERVICE: HCPCS | Performed by: PSYCHIATRY & NEUROLOGY

## 2021-04-03 PROCEDURE — A9270 NON-COVERED ITEM OR SERVICE: HCPCS | Performed by: STUDENT IN AN ORGANIZED HEALTH CARE EDUCATION/TRAINING PROGRAM

## 2021-04-03 PROCEDURE — 80061 LIPID PANEL: CPT

## 2021-04-03 PROCEDURE — 70551 MRI BRAIN STEM W/O DYE: CPT

## 2021-04-03 PROCEDURE — 99232 SBSQ HOSP IP/OBS MODERATE 35: CPT | Performed by: INTERNAL MEDICINE

## 2021-04-03 PROCEDURE — 97161 PT EVAL LOW COMPLEX 20 MIN: CPT

## 2021-04-03 PROCEDURE — 97165 OT EVAL LOW COMPLEX 30 MIN: CPT

## 2021-04-03 RX ORDER — MECLIZINE HYDROCHLORIDE 25 MG/1
12.5 TABLET ORAL 3 TIMES DAILY PRN
Status: DISCONTINUED | OUTPATIENT
Start: 2021-04-03 | End: 2021-04-03 | Stop reason: HOSPADM

## 2021-04-03 RX ORDER — MECLIZINE HCL 12.5 MG/1
12.5 TABLET ORAL 3 TIMES DAILY PRN
Qty: 30 TABLET | Refills: 0 | Status: SHIPPED | OUTPATIENT
Start: 2021-04-03 | End: 2021-04-16

## 2021-04-03 RX ADMIN — ASPIRIN 162 MG: 81 TABLET, CHEWABLE ORAL at 04:47

## 2021-04-03 RX ADMIN — DOCUSATE SODIUM 50 MG AND SENNOSIDES 8.6 MG 2 TABLET: 8.6; 5 TABLET, FILM COATED ORAL at 04:47

## 2021-04-03 ASSESSMENT — ENCOUNTER SYMPTOMS
DEPRESSION: 0
HALLUCINATIONS: 0
BLURRED VISION: 1
WEIGHT LOSS: 0
NAUSEA: 0
COUGH: 0
ABDOMINAL PAIN: 0
CHILLS: 0
SPUTUM PRODUCTION: 0
FOCAL WEAKNESS: 0
SEIZURES: 0
TINGLING: 0
VOMITING: 0
DOUBLE VISION: 1
MYALGIAS: 0
PALPITATIONS: 0
SENSORY CHANGE: 0
DIZZINESS: 1
ORTHOPNEA: 0
NECK PAIN: 0
PHOTOPHOBIA: 0
HEMOPTYSIS: 0
CLAUDICATION: 0
TREMORS: 0
BACK PAIN: 0
FEVER: 0
CONSTIPATION: 0
HEADACHES: 0
DIARRHEA: 0
HEARTBURN: 0
SHORTNESS OF BREATH: 0
WEAKNESS: 0

## 2021-04-03 ASSESSMENT — COGNITIVE AND FUNCTIONAL STATUS - GENERAL
MOBILITY SCORE: 24
SUGGESTED CMS G CODE MODIFIER DAILY ACTIVITY: CH
DAILY ACTIVITIY SCORE: 24
SUGGESTED CMS G CODE MODIFIER MOBILITY: CH

## 2021-04-03 ASSESSMENT — GAIT ASSESSMENTS
GAIT LEVEL OF ASSIST: SUPERVISED
DEVIATION: NO DEVIATION
DISTANCE (FEET): 500

## 2021-04-03 ASSESSMENT — PAIN DESCRIPTION - PAIN TYPE
TYPE: ACUTE PAIN

## 2021-04-03 ASSESSMENT — LIFESTYLE VARIABLES: SUBSTANCE_ABUSE: 0

## 2021-04-03 NOTE — CARE PLAN
Problem: Safety  Goal: Free from accidental injury  Outcome: PROGRESSING AS EXPECTED  Note: Pt scores no risk on stearns patricia scale; however has had recent dizziness and vision changes, therefore bed alarm in place and educated about fall risk and use of call light, pt understanding. Bed low and locked, nonskid socks on, call light and belongings within reach, clutter and spill free environment maintained, fall precaution signage in place      Problem: Knowledge Deficit  Goal: Patient/Significant other demonstrates understanding of disease process, treatment plan, medications and discharge instructions  Outcome: PROGRESSING AS EXPECTED  Note: Educating pt about strokes, NIH assessments, MRI, and medications; encouraging questions

## 2021-04-03 NOTE — THERAPY
"Occupational Therapy   Initial Evaluation     Patient Name: Jhonny Snyder  Age:  45 y.o., Sex:  male  Medical Record #: [id number]  Today's Date: 4/3/2021     Precautions  Precautions: Fall Risk    Assessment  Patient at / near baseline necessitating I-Mod I with ADLs and mobility with no AE. Patient demos slight hand to eye coordination impairment with L UE demands. Patient has no further skilled OT needs in this setting at this time. Rec OP as needed.     Plan    Recommend Occupational Therapy for Evaluation only.    DC Equipment Recommendations: None  Discharge Recommendations: Recommend outpatient occupational therapy services to address higher level deficits     Subjective    \"I am court to have parents who can help me if I need it\"     Objective       04/03/21 0850   Prior Living Situation   Prior Services None   Housing / Facility 1 Story House   Steps Into Home 2   Bathroom Set up Walk In Shower   Equipment Owned None   Lives with - Patient's Self Care Capacity Alone and Able to Care For Self  (parents live nearby and able to assist as needed)   Prior Level of ADL Function   Comments I at baseline for ADLs, mobility, and IADLs   Cognition    Cognition / Consciousness WDL   Coordination Upper Body   Comments patient demoed slight impaired hand eye coordination with L UE only demands. Reports self as R handed.    Balance Assessment   Weight Shift Standing Good   Comments no AE   ADL Assessment   Eating Independent   Grooming Independent   Bathing Independent   Upper Body Dressing Independent   Lower Body Dressing Independent   Toileting Independent   Functional Mobility   Mobility no AE household distances   Comments mod I due to patient report taking extra time to perform \"just in case\"   Visual Perception   Comments slight impaired hand eye coordination and ataxia with higher level demands. Patient self reports as \"not too bad\"   Patient / Family Goals   Patient / Family Goal #1 eval only "

## 2021-04-03 NOTE — THERAPY
"Speech Language Pathology   Clinical Swallow Evaluation     Patient Name: Jhonny Snyder  AGE:  45 y.o., SEX:  male  Medical Record #: [id number]  Today's Date: 4/2/2021          Assessment    Patient is 45 y.o. male admitted on 4/2/21 for dizzines, blurred vision, abnormal gait towards LEFT side, N/V.  L lip tingling/numbness. CXR 4/2/21:  L lip tingling/numbness. CT of head 4/2/21 : No evidence of acute intracranial process. Per Dr. Mai, \"Clinical semiology of his vertigo more suggestive of a peripheral vertigo, however CT perfusion is suggestive of possible L cerebellar infarct.\" MRI pending. Additional factors influencing patient status/progress: CVA rule out.     Patient was seen on this date for a clinical swallow evaluation. Patient was AAOx4, speech intact. Vocal quality clear. Pt denied hx of dysphagia or current difficulty managing secretions. Oral motor examination revealed mild deviation to left with tongue protrusion and reported left upper and lower lip numbness (rated as 2/10 per pt). Pt reported significantly improved from this AM when he had both numbness and tingling. No other gross asymmetry or weakness noted. Diadochokinetic rates WNL. PO trials were administered and consisted of ice chips x1, ~20 oz thin liquids, 4 oz fruit cocktail (soft and regular in mixed form), and a bag of sun chips (dry solids). Onset of swallow trigger was timely and laryngeal elevation complete to palpation. Patient had coughing response x1 following mixed consistencies with pt stating it \"went down the wrong way.\" However, occurred when eating at a rapid rate and taking large bites. Subsequent trials of mixed consistencies, dry solids, and sequential sips of thin liquids were consumed without any s/sx of aspiration. Vocal quality stayed clear all session. Education provided to pt regarding current status and SLP recs.     Plan    Recommend a regular/thin liquid diet with adherence to standard swallow " precautions (sit up 90*, slow feeding rate, small sips/bites). MRI pending - SLP following.     Recommend Speech Therapy 2 times per week until therapy goals are met for the following treatments:  Dysphagia Training and Patient / Family / Caregiver Education.    Discharge Recommendations: Anticipate that the patient will have no further speech therapy needs after discharge from the hospital     Objective       04/02/21 1658   Vitals   O2 Delivery Device None - Room Air   Oral Motor Eval    Is Patient Able to Complete Oral Motor Eval Yes but Impaired   Labial Function   Labial Structure At Rest Within Functional Limits   Labial Vowel Production / I /, / U / Within Functional Limits   Labial Sequence / I /, / U / Within Functional Limits   Lingual Function   Lingual Structure At Rest Within Functional Limits   Lingual Protrude Minimal   Lingual Retract Within Functional Limits   Elevate In Mouth Within Functional Limits   Elevate Outside Mouth Within Functional Limits   Lateralization No Impairment Right;No Impairment Left   / Pa / 5X's Within Functional Limits   / Ta / 5X's Within Functional Limits   / Ka / 5X's Not Tested   / [place] / 5X's Within Functional Limits   Jaw   Jaw Structure At Rest Within Functional Limits   Bite ([place]) Within Functional Limits   Chew ([place]) Within Functional Limits   Jaw Open / Resist Within Functional Limits   Jaw Close / Resist Within Functional Limits   Velar Function   Velar Structure At Rest Within Functional Limits   / A / Prolonged Within Functional Limits   / A /  5X's Within Functional Limits   Laryngeal Function   Voice Quality Within Functional Limits   Volutional Cough Within Functional Limits   Excursion Upon Swallow Complete   Oral Food Presentation   Ice Chips Within Functional Limits   Single Swallow Thin (0) Within Functional Limits   Serial Swallow Thin (0) Within Functional Limits   Pureed (4) Within Functional Limits   Soft & Bite-Sized (6) - (Dysphagia III)  Minimal   Regular (7) Within Functional Limits   Self Feeding Independent   Dysphagia Strategies / Recommendations   Compensatory Strategies Monitor During Meals;Head of Bed 90 Degrees During Eating / Drinking;Single Sips / Bites   Diet / Liquid Recommendation Thin (0);Regular (7)   Medication Administration  Whole with Liquid Wash   Therapy Interventions Dysphagia Therapy By Speech Language Pathologist   Short Term Goals   Short Term Goal # 1 Patient will consume a RG7/TN0 diet with no overt s/sx of aspiration.

## 2021-04-03 NOTE — THERAPY
"Physical Therapy   Initial Evaluation     Patient Name: Jhonny Snyder  Age:  45 y.o., Sex:  male  Medical Record #: [id number]  Today's Date: 4/3/2021     Precautions: Fall Risk    Assessment  Patient is 45 y.o. male admitted for stroke workup with L facial numbness, diplopia and impaired L UE coordination. Today patient's symptoms have resolved. He demos no visual deficits or nystagmus. His coordination and strength are WNL and he is indep with all mobility. No further acute care PT needs at this time. Please re-consult if there is a change in patient status.     Plan    DC Equipment Recommendations: None  Discharge Recommendations: Anticipate that the patient will have no further physical therapy needs after discharge from the hospital       Subjective    \"I'm feeling much better now\"     Objective       04/03/21 1020   Precautions   Precautions Fall Risk   Pain 0 - 10 Group   Pain Rating Scale (NPRS) 0   Prior Living Situation   Prior Services None   Housing / Facility 1 Story House   Steps Into Home 2   Steps In Home 0   Equipment Owned None   Lives with - Patient's Self Care Capacity Alone and Able to Care For Self   Comments father lives 8 kminutes away   Prior Level of Functional Mobility   Bed Mobility Independent   Transfer Status Independent   Ambulation Independent   Distance Ambulation (Feet)   (community distances )   Assistive Devices Used None   Stairs Independent   Comments patient works as a \"\"   History of Falls   History of Falls No   Cognition    Cognition / Consciousness WDL   Level of Consciousness Alert   Comments pleasant and cooperative    Active ROM Lower Body    Active ROM Lower Body  WDL   Strength Lower Body   Lower Body Strength  WDL   Sensation Lower Body   Lower Extremity Sensation   WDL   Lower Body Muscle Tone   Lower Body Muscle Tone  WDL   Strength Upper Body   Upper Body Strength  WDL   Upper Body Muscle Tone   Upper Body Muscle Tone  WDL   Coordination Upper " Body   Coordination WDL   Coordination Lower Body    Coordination Lower Body  WDL   Vision   Vision Comments no longer has diplopia or nystagmus   Balance Assessment   Sitting Balance (Static) Good   Sitting Balance (Dynamic) Good   Standing Balance (Static) Good   Standing Balance (Dynamic) Good   Weight Shift Sitting Good   Weight Shift Standing Good   Gait Analysis   Gait Level Of Assist Supervised   Assistive Device None   Distance (Feet) 500   # of Times Distance was Traveled 1   Deviation No deviation   Bed Mobility    Supine to Sit Independent   Sit to Supine Independent   Scooting Independent   Rolling Independent   Functional Mobility   Sit to Stand Supervised   Bed, Chair, Wheelchair Transfer Supervised   How much difficulty does the patient currently have...   Turning over in bed (including adjusting bedclothes, sheets and blankets)? 4   Sitting down on and standing up from a chair with arms (e.g., wheelchair, bedside commode, etc.) 4   Moving from lying on back to sitting on the side of the bed? 4   How much help from another person does the patient currently need...   Moving to and from a bed to a chair (including a wheelchair)? 4   Need to walk in a hospital room? 4   Climbing 3-5 steps with a railing? 4   6 clicks Mobility Score 24   Education Group   Education Provided Role of Physical Therapist;Gait Training   Role of Physical Therapist Patient Response Patient;Acceptance;Explanation;Demonstration;Verbal Demonstration   Gait Training Patient Response Patient;Acceptance;Explanation;Demonstration;Verbal Demonstration;Action Demonstration   Anticipated Discharge Equipment and Recommendations   DC Equipment Recommendations None   Discharge Recommendations Anticipate that the patient will have no further physical therapy needs after discharge from the hospital       Daniela Mack, PT, DPT, GCS

## 2021-04-03 NOTE — PROGRESS NOTES
1325: Pt off floor to MRI with pt transport via wheelchair     1352: Pt returned from MRI with pt transport via wheelchair

## 2021-04-03 NOTE — CARE PLAN
Problem: Safety  Goal: Will remain free from injury  Outcome: PROGRESSING AS EXPECTED  Patient calling appropriately for assistance    Problem: Knowledge Deficit  Goal: Knowledge of disease process/condition, treatment plan, diagnostic tests, and medications will improve  Outcome: PROGRESSING AS EXPECTED  Patient educated on how MRI's are performed.

## 2021-04-03 NOTE — PROGRESS NOTES
[facility] Medicine Daily Progress Note    Date of Service  4/3/2021    Chief Complaint  45 y.o. male admitted 4/2/2021 with dizziness and ataxia    Hospital Course  45 y.o. male with past medical history of gout who presented 4/2/2021 with symptoms of dizziness and ataxia that started acutely yesterday.  Patient states that he felt like his vision got blurry, and he could not walk straight, he also developed nausea and vomiting and some numbness to his left upper lip.  His symptoms improved but did not resolve thus he presented to the hospital today for further evaluation.  He states that he has never had symptoms like this before, he denies headache, ear pain or tinnitus, denies congestion or sore throat.  He denies any focal weakness    CT head was negative for hemorrhage, CT perfusion study did reveal evolving left cerebellar infarct, as per chart review CT angiogram showed small right vertebral artery with termination at the posterior inferior cerebellar artery and a 2 mm aneurysm arising from the A3 segment of the right anterior cerebral artery, no large vessel occlusion was seen.    Neurology was consulted and patient will be admitted for stroke work-up and MRI.  Patient did not receive TPA as he was outside of window.     Interval Problem Update  4/3: Patient seen at bedside this morning.  Patient still complaining of patient abnormalities, dizziness and abnormal coordination when walking.  We are pending MRI.  We appreciate further recommendations by neurology.  As per nursing no other overnight events were reported.    Consultants/Specialty  Neurology    Code Status  Full Code    Disposition  Pending PT/OT.    Review of Systems  Review of Systems   Constitutional: Negative for chills, fever, malaise/fatigue and weight loss.   HENT: Negative for congestion, ear discharge, ear pain, hearing loss, nosebleeds and tinnitus.    Eyes: Positive for blurred vision and double vision. Negative for photophobia.    Respiratory: Negative for cough, hemoptysis, sputum production and shortness of breath.    Cardiovascular: Negative for chest pain, palpitations, orthopnea and claudication.   Gastrointestinal: Negative for abdominal pain, constipation, diarrhea, heartburn, nausea and vomiting.   Genitourinary: Negative for dysuria, frequency, hematuria and urgency.   Musculoskeletal: Negative for back pain, myalgias and neck pain.   Neurological: Positive for dizziness. Negative for tingling, tremors, sensory change, focal weakness, seizures, weakness and headaches.        Balance problem   Psychiatric/Behavioral: Negative for depression, hallucinations and substance abuse.        Physical Exam  Temp:  [36.3 °C (97.3 °F)-36.9 °C (98.5 °F)] 36.4 °C (97.5 °F)  Pulse:  [61-83] 70  Resp:  [17-20] 17  BP: (131-176)/() 140/93  SpO2:  [97 %-99 %] 97 %    Physical Exam  Constitutional:       General: He is not in acute distress.     Appearance: Normal appearance. He is not ill-appearing.   HENT:      Head: Normocephalic and atraumatic.      Mouth/Throat:      Pharynx: No oropharyngeal exudate or posterior oropharyngeal erythema.   Eyes:      General:         Right eye: No discharge.         Left eye: No discharge.   Cardiovascular:      Rate and Rhythm: Normal rate and regular rhythm.      Pulses: Normal pulses.      Heart sounds: No murmur. No gallop.    Pulmonary:      Effort: Pulmonary effort is normal. No respiratory distress.      Breath sounds: No stridor. No wheezing or rales.   Abdominal:      General: There is no distension.      Palpations: Abdomen is soft.      Tenderness: There is no abdominal tenderness. There is no guarding.   Musculoskeletal:         General: Normal range of motion.      Cervical back: Normal range of motion.      Right lower leg: No edema.      Left lower leg: No edema.   Skin:     General: Skin is warm and dry.   Neurological:      General: No focal deficit present.      Mental Status: He is alert  and oriented to person, place, and time.      Cranial Nerves: No cranial nerve deficit.      Coordination: Coordination abnormal.   Psychiatric:         Mood and Affect: Mood normal.         Behavior: Behavior normal.         Thought Content: Thought content normal.         Judgment: Judgment normal.         Fluids    Intake/Output Summary (Last 24 hours) at 4/3/2021 1029  Last data filed at 4/3/2021 0800  Gross per 24 hour   Intake 580 ml   Output --   Net 580 ml       Laboratory  Recent Labs     04/02/21  1334   WBC 9.5   RBC 5.42   HEMOGLOBIN 17.2   HEMATOCRIT 47.8   MCV 88.2   MCH 31.7   MCHC 36.0*   RDW 38.8   PLATELETCT 222   MPV 9.5     Recent Labs     04/02/21  1334   SODIUM 139   POTASSIUM 3.9   CHLORIDE 106   CO2 24   GLUCOSE 111*   BUN 9   CREATININE 0.86   CALCIUM 9.4     Recent Labs     04/02/21  1334   APTT 25.9   INR 0.95         Recent Labs     04/03/21  0541   TRIGLYCERIDE 93   HDL 53   LDL 70       Imaging  EC-ECHOCARDIOGRAM COMPLETE W/O CONT         DX-CHEST-PORTABLE (1 VIEW)   Final Result      No acute cardiopulmonary findings.      CT-CTA NECK WITH & W/O-POST PROCESSING   Final Result      1.  No evidence of carotid or vertebral arterial occlusion.      2.  Diminutive right vertebral artery.      3.  2 cm right-sided thyroid nodule.      CT-CTA HEAD WITH & W/O-POST PROCESS   Final Result      1.  No large vessel occlusion is identified.      2.  Small right vertebral artery with termination as the posterior inferior cerebellar artery.      3.  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  Cerebral blood flow less than 30% likely representing completed infarct = 0 mL.      2.  T Max more than 6 seconds likely representing combination of completed infarct and ischemia = 9 mL.      3.  Mismatched volume likely representing ischemic brain/penumbra = 9 mL      4.  Please note that the cerebral perfusion was  performed on the limited brain tissue around the basal ganglia region. Infarct/ischemia outside the CT perfusion sections can be missed in this study.      CT-HEAD W/O   Final Result      No evidence of acute intracranial process.      MR-BRAIN-WITH & W/O    (Results Pending)        Assessment/Plan  * Cerebellar stroke (HCC)- (present on admission)  Assessment & Plan  Patient presents with symptoms of blurred vision and disequilibrium that occurred yesterday on 4/1/2021  -CT head shows no hemorrhage, CT perfusion does show a mismatched area in the left cerebellum concerning for evolving ischemic infarct  -Neurology consulted, appreciate their help with management recommendations  -MRI brain ordered  -Aspirin initiated by neurology  -N.p.o. pending speech  -PT OT  -Echocardiogram  -Check lipid and glycohemoglobin for risk stratification  -Monitor on telemetry  -Permissive hypertension    4/3: Pending MRI.  We appreciate further recommendations by neurology.      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         VTE prophylaxis: SCDs for now until MRI is done.

## 2021-04-04 NOTE — PROGRESS NOTES
Neurology Progress Note  Neurohospitalist Service, [facility]    Referring Physician: Salvatore Castellon*      Interval History: No acute events overnight. Vertigo much improved, able to ambulate, but still feels residual dysequilibrium, vision changes, worse with movements.  MRI completed and negative for infarct.    Review of systems: In addition to what is detailed in the HPI and/or updated in the interval history, all other systems reviewed and are negative.    Past Medical History, Past Surgical History and Social History reviewed and unchanged from prior    Medications:    Current Facility-Administered Medications:   •  aspirin (ASA) chewable tab 162 mg, 162 mg, Oral, DAILY, Seng Mai M.D., 162 mg at 04/03/21 0447  •  senna-docusate (PERICOLACE or SENOKOT S) 8.6-50 MG per tablet 2 tablet, 2 tablet, Oral, BID, 2 tablet at 04/03/21 0447 **AND** polyethylene glycol/lytes (MIRALAX) PACKET 1 Packet, 1 Packet, Oral, QDAY PRN **AND** magnesium hydroxide (MILK OF MAGNESIA) suspension 30 mL, 30 mL, Oral, QDAY PRN **AND** bisacodyl (DULCOLAX) suppository 10 mg, 10 mg, Rectal, QDAY PRN, Li Yun M.D.  •  Pharmacy consult request - Allow for permissive hypertension: SBP up to 220 mmHg/DBP up to 120 mmHg x 48 hours, , Other, PHARMACY TO DOSE, Li Yun M.D.  •  acetaminophen (Tylenol) tablet 650 mg, 650 mg, Oral, Q6HRS PRN, Li Yun M.D.  •  ondansetron (ZOFRAN) syringe/vial injection 4 mg, 4 mg, Intravenous, Q4HRS PRN, Li Yun M.D.  •  ondansetron (ZOFRAN ODT) dispertab 4 mg, 4 mg, Oral, Q4HRS PRN, Li Yun M.D.  •  promethazine (PHENERGAN) tablet 12.5-25 mg, 12.5-25 mg, Oral, Q4HRS PRN, Li Yun M.D.  •  promethazine (PHENERGAN) suppository 12.5-25 mg, 12.5-25 mg, Rectal, Q4HRS PRN, Li Yun M.D.  •  prochlorperazine (COMPAZINE) injection 5-10 mg, 5-10 mg, Intravenous, Q4HRS PRN, Li Yun M.D.  •  labetalol  (NORMODYNE/TRANDATE) injection 10 mg, 10 mg, Intravenous, Q4HRS PRN **OR** hydrALAZINE (APRESOLINE) injection 10 mg, 10 mg, Intravenous, Q2HRS PRN, Li Yun M.D.    Physical Examination:     Vitals:    04/03/21 0000 04/03/21 0359 04/03/21 0721 04/03/21 1653   BP: 159/105 131/84 140/93 113/74   Pulse: 83 65 70 70   Resp: 17 17 17 18   Temp: 36.9 °C (98.5 °F) 36.3 °C (97.3 °F) 36.4 °C (97.5 °F) 36.4 °C (97.5 °F)   TempSrc: Temporal Temporal Temporal Temporal   SpO2: 98% 98% 97% 96%   Weight:       Height:           NEUROLOGICAL EXAM:     Mental status: Awake, alert and fully oriented, follows commands  Speech and language: Speech is not dysarthric. The patient is able to name and repeat.  Cranial nerve exam:  Visual fields are full. Extraocular muscles are intact. Extinguishable end gaze nystagmus.  Sensation in the face is intact to light touch. Face is symmetric.  Motor exam: Sustained antigravity strength in all 4 extremities without downward drift.  There is no pronator drift. Tone is normal. No abnormal movements were seen on exam.  Sensory exam: Intact to light touch in all 4 distal extremities  Coordination: no ataxia on bilateral FTN testing    Objective Data:    Labs:  Lab Results   Component Value Date/Time    PROTHROMBTM 13.0 04/02/2021 01:34 PM    INR 0.95 04/02/2021 01:34 PM      Lab Results   Component Value Date/Time    WBC 9.5 04/02/2021 01:34 PM    RBC 5.42 04/02/2021 01:34 PM    HEMOGLOBIN 17.2 04/02/2021 01:34 PM    HEMATOCRIT 47.8 04/02/2021 01:34 PM    MCV 88.2 04/02/2021 01:34 PM    MCH 31.7 04/02/2021 01:34 PM    MCHC 36.0 (H) 04/02/2021 01:34 PM    MPV 9.5 04/02/2021 01:34 PM    NEUTSPOLYS 75.80 (H) 04/02/2021 01:34 PM    LYMPHOCYTES 16.50 (L) 04/02/2021 01:34 PM    MONOCYTES 6.50 04/02/2021 01:34 PM    EOSINOPHILS 0.40 04/02/2021 01:34 PM    BASOPHILS 0.20 04/02/2021 01:34 PM      Lab Results   Component Value Date/Time    SODIUM 139 04/02/2021 01:34 PM    POTASSIUM 3.9 04/02/2021  01:34 PM    CHLORIDE 106 04/02/2021 01:34 PM    CO2 24 04/02/2021 01:34 PM    GLUCOSE 111 (H) 04/02/2021 01:34 PM    BUN 9 04/02/2021 01:34 PM    CREATININE 0.86 04/02/2021 01:34 PM    BUNCREATRAT 12 06/11/2019 07:28 AM      Lab Results   Component Value Date/Time    CHOLSTRLTOT 142 04/03/2021 05:41 AM    LDL 70 04/03/2021 05:41 AM    HDL 53 04/03/2021 05:41 AM    TRIGLYCERIDE 93 04/03/2021 05:41 AM       Lab Results   Component Value Date/Time    ALKPHOSPHAT 69 04/02/2021 01:34 PM    ASTSGOT 11 (L) 04/02/2021 01:34 PM    ALTSGPT 14 04/02/2021 01:34 PM    TBILIRUBIN 0.7 04/02/2021 01:34 PM        Imaging/Testing:    I interpreted and/or reviewed the patient's neuroimaging    MR-BRAIN-W/O   Final Result      1.  No evidence of acute territorial infarct, intracranial hemorrhage or mass lesion.   2.  Minimal microangiopathic ischemic change versus demyelination or gliosis.      EC-ECHOCARDIOGRAM COMPLETE W/O CONT   Final Result      DX-CHEST-PORTABLE (1 VIEW)   Final Result      No acute cardiopulmonary findings.      CT-CTA NECK WITH & W/O-POST PROCESSING   Final Result      1.  No evidence of carotid or vertebral arterial occlusion.      2.  Diminutive right vertebral artery.      3.  2 cm right-sided thyroid nodule.      CT-CTA HEAD WITH & W/O-POST PROCESS   Final Result      1.  No large vessel occlusion is identified.      2.  Small right vertebral artery with termination as the posterior inferior cerebellar artery.      3.  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  Cerebral blood flow less than 30% likely representing completed infarct = 0 mL.      2.  T Max more than 6 seconds likely representing combination of completed infarct and ischemia = 9 mL.      3.  Mismatched volume likely representing ischemic brain/penumbra = 9 mL      4.  Please note that the cerebral perfusion was performed on the limited brain tissue  around the basal ganglia region. Infarct/ischemia outside the CT perfusion sections can be missed in this study.      CT-HEAD W/O   Final Result      No evidence of acute intracranial process.          Assessment and Plan:  Jhonny Snyder is a 45 year-old man presenting with positional vertigo, with associated lip paresthesias and vision changes.  MRI negative for acute ischemia despite the persistence of symptoms.  Symptoms are not related to central ischemia, and more suggestive of BPPV.  No clinical indication for primary stroke prevention regimen given lack of vascular risk factors.  Outpatient surveillance for his JUDY aneurysm.  Ok for discharge at this time from neurologic perspective.    Plan:   - meclizine PRN, educated on Epley maneuvers   - no indication for ASA or statin therapy   - outpatient referral to Vascular Neurosurgery for his incidental JUDY aneurysm- he will need surveillance imaging      The evaluation of the patient, and recommended management, was discussed with the attending hospitalist. I have performed a physical exam and reviewed and updated ROS and Plan today (4/3/2021).     Seng Mai MD  Neurohospitalist, Acute Care Services

## 2021-04-04 NOTE — DISCHARGE SUMMARY
Discharge Summary    CHIEF COMPLAINT ON ADMISSION  Chief Complaint   Patient presents with   • Sent from Urgent Care       Reason for Admission  sent by      Admission Date  4/2/2021    CODE STATUS  Full Code    HPI & HOSPITAL COURSE  45 y.o. male with past medical history of gout who presented 4/2/2021 with symptoms of dizziness and ataxia that started acutely yesterday.  Patient states that he felt like his vision got blurry, and he could not walk straight, he also developed nausea and vomiting and some numbness to his left upper lip.  His symptoms improved but did not resolve thus he presented to the hospital today for further evaluation.  He states that he has never had symptoms like this before, he denies headache, ear pain or tinnitus, denies congestion or sore throat.  He denies any focal weakness.     CT head was negative for hemorrhage, as per chart review on H&P CT perfusion study was initially concerning for evolving left cerebellar infarct however after discussing with neurologist he believes it might be artifact, as per chart review CT angiogram showed small right vertebral artery with termination at the posterior inferior cerebellar artery and a 2 mm aneurysm arising from the A3 segment of the right anterior cerebral artery, no large vessel occlusion was seen.    MRI was negative, reported: No evidence of acute territorial infarct, intracranial hemorrhage or mass lesion. Neurology believes his symptoms could be explained by Benign Paroxysmal Positional Vertigo. Neurology explained Epley maneuver to patient and recommended Meclizine PRN.    PT did not recommend further needs. Neurology recommends patient can be discharged home with close follow up with PCP, and referral to vascular surgery or neurosurgery to monitor aneurysm.     CT scan also reported 2 cm right side thyroid nodule that the patient will require follow up as outpatient.    Patient does not have PCP and was advised he establish PCP  consult to follow up as outpatient.    The patient mentions his symptoms have greatly improved and would like to go home.    Therefore, he is discharged in fair and stable condition to home with close outpatient follow-up.    The patient recovered much more quickly than anticipated on admission.    Discharge Date  4/3/21    FOLLOW UP ITEMS POST DISCHARGE  Patient will require close follow up with PCP. He will need referral for vascular surgery or neurosurgery to monitor aneurysm, and also follow up as outpatient for thyroid nodule.    DISCHARGE DIAGNOSES  Principal Problem (Resolved):    Cerebellar stroke (HCC) POA: Yes  Active Problems:    Anterior cerebral artery aneurysm POA: Yes    Thyroid nodule POA: Yes    BPPV (benign paroxysmal positional vertigo) POA: Unknown    Gout of multiple sites POA: Yes      FOLLOW UP  No future appointments.  No follow-up provider specified.    MEDICATIONS ON DISCHARGE     Medication List      START taking these medications      Instructions   meclizine 12.5 MG Tabs  Commonly known as: ANTIVERT   Take 1 tablet by mouth 3 times a day as needed (dizziness).  Dose: 12.5 mg        CONTINUE taking these medications      Instructions   allopurinol 300 MG Tabs  Commonly known as: ZYLOPRIM   allopurinol 300 mg tablet        STOP taking these medications    colchicine 0.6 MG Tabs  Commonly known as: COLCRYS     ibuprofen 200 MG Tabs  Commonly known as: MOTRIN     indomethacin 50 MG Caps  Commonly known as: INDOCIN            Allergies  No Known Allergies    DIET  Orders Placed This Encounter   Procedures   • Diet Order Diet: Regular     Standing Status:   Standing     Number of Occurrences:   1     Order Specific Question:   Diet:     Answer:   Regular [1]       ACTIVITY  As tolerated.  Weight bearing as tolerated    CONSULTATIONS  Neurology    PROCEDURES  None    MR-BRAIN-W/O   Final Result      1.  No evidence of acute territorial infarct, intracranial hemorrhage or mass lesion.   2.   Minimal microangiopathic ischemic change versus demyelination or gliosis.      EC-ECHOCARDIOGRAM COMPLETE W/O CONT   Final Result      DX-CHEST-PORTABLE (1 VIEW)   Final Result      No acute cardiopulmonary findings.      CT-CTA NECK WITH & W/O-POST PROCESSING   Final Result      1.  No evidence of carotid or vertebral arterial occlusion.      2.  Diminutive right vertebral artery.      3.  2 cm right-sided thyroid nodule.      CT-CTA HEAD WITH & W/O-POST PROCESS   Final Result      1.  No large vessel occlusion is identified.      2.  Small right vertebral artery with termination as the posterior inferior cerebellar artery.      3.  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  Cerebral blood flow less than 30% likely representing completed infarct = 0 mL.      2.  T Max more than 6 seconds likely representing combination of completed infarct and ischemia = 9 mL.      3.  Mismatched volume likely representing ischemic brain/penumbra = 9 mL      4.  Please note that the cerebral perfusion was performed on the limited brain tissue around the basal ganglia region. Infarct/ischemia outside the CT perfusion sections can be missed in this study.      CT-HEAD W/O   Final Result      No evidence of acute intracranial process.          LABORATORY  Lab Results   Component Value Date    SODIUM 139 04/02/2021    POTASSIUM 3.9 04/02/2021    CHLORIDE 106 04/02/2021    CO2 24 04/02/2021    GLUCOSE 111 (H) 04/02/2021    BUN 9 04/02/2021    CREATININE 0.86 04/02/2021        Lab Results   Component Value Date    WBC 9.5 04/02/2021    HEMOGLOBIN 17.2 04/02/2021    HEMATOCRIT 47.8 04/02/2021    PLATELETCT 222 04/02/2021        Total time of the discharge process exceeds 35 minutes.

## 2021-04-04 NOTE — PROGRESS NOTES
Per Dr. Han, neurohospitalist stated pt did not have a stroke and potential cerebellar infact on CT was artifact. Pt reporting dizziness and mouth numbness is no longer present, states vision is still slightly blurred upon looking around room, Dr. Han is aware. Pt states he feels ready to go home.     1930: Discharge instructions provided, educated pt to obtain a primary care provider to follow up about aneurysm and thyroid nodule, pt agreeable. All questions answered. Pt to call family for family to  pt, night shift RN aware.

## 2021-04-04 NOTE — DISCHARGE INSTRUCTIONS
Discharge Instructions    Discharged to home by car with relative. Discharged via wheelchair, hospital escort: Yes.  Special equipment needed: Not Applicable    Be sure to schedule a follow-up appointment with your primary care doctor or any specialists as instructed.     Discharge Plan:   Diet Plan: Discussed  Activity Level: Discussed  Confirmed Follow up Appointment: Patient to Call and Schedule Appointment  Confirmed Symptoms Management: Discussed  Medication Reconciliation Updated: Yes    I understand that a diet low in cholesterol, fat, and sodium is recommended for good health. Unless I have been given specific instructions below for another diet, I accept this instruction as my diet prescription.   Other diet: Regular    Special Instructions: None    · Is patient discharged on Warfarin / Coumadin?   No     Depression / Suicide Risk    As you are discharged from this [facility] facility, it is important to learn how to keep safe from harming yourself.    Recognize the warning signs:  · Abrupt changes in personality, positive or negative- including increase in energy   · Giving away possessions  · Change in eating patterns- significant weight changes-  positive or negative  · Change in sleeping patterns- unable to sleep or sleeping all the time   · Unwillingness or inability to communicate  · Depression  · Unusual sadness, discouragement and loneliness  · Talk of wanting to die  · Neglect of personal appearance   · Rebelliousness- reckless behavior  · Withdrawal from people/activities they love  · Confusion- inability to concentrate     If you or a loved one observes any of these behaviors or has concerns about self-harm, here's what you can do:  · Talk about it- your feelings and reasons for harming yourself  · Remove any means that you might use to hurt yourself (examples: pills, rope, extension cords, firearm)  · Get professional help from the community (Mental Health, Substance Abuse, psychological  counseling)  · Do not be alone:Call your Safe Contact- someone whom you trust who will be there for you.  · Call your local CRISIS HOTLINE 656-3428 or 979-823-8691  · Call your local Children's Mobile Crisis Response Team [place] (275) 932-3753 or www.[employer]  · Call the toll free National Suicide Prevention Hotlines   · National Suicide Prevention Lifeline 857-588-BIHY (0881)  · National Hope Line Network 800-SUICIDE (281-9494)        - Discharge Instructions to Pt:  -You will need to establish primary care provider  · Follow up with your Primary Care Physician in 3 to 5 days   · Be compliant with your follow up appointments.  · Please be compliant with your medications, including new ones.  · A new medication has been given please take as advised.  · Keep blood pressure controlled and be compliant to keep systolic blood pressure <140.  · Please adhere to a healthy diet, that consist of low fat, low salt, low calorie.  · Weight loss and physical activity as tolerated is encouraged.   · Ambulate with assistance.  ·  If there any signs or symptoms of worsening or symptoms recurring, please call your Primary Care Physician or return to Emergency Department for further assessment.  · Avoid sudden changes in position, like standing up quickly.  · Do not drive until cleared by PCP.  · Refrain from drinking alcohol and smoking.     Recommendation to PCP:  · Would recommend a CBC, BMP in 3-5 days.  -Your patient will require referral to vascular surgery or neurosurgery for monitoring aneurysm.  -Your patient will require outpatient follow up for thyroid nodule.  · Your pt will need close monitoring.  · If  failure to respond to therapy, we suggest having patient return for further care, or if cannot be treated as an outpatient, return to ED if worsening of symptoms.  · Please make certain your pt follows up with specialist appointments  · Ensure patient adheres to diet and lifestyle modifications and  reconcile.  · Please note the change to medication and reconcile.  · Patient without progression of symptoms. Prognosis and risk factors discussed in detail with patient and he understands.

## 2021-04-04 NOTE — ASSESSMENT & PLAN NOTE
Patient presents with symptoms of blurred vision and disequilibrium that occurred yesterday on 4/1/2021  -CT head shows no hemorrhage, CT perfusion does show a mismatched area in the left cerebellum concerning for evolving ischemic infarct  -Neurology consulted, appreciate their help with management recommendations  -MRI brain ordered  -Aspirin initiated by neurology  -N.p.o. pending speech  -PT OT  -Echocardiogram  -Check lipid and glycohemoglobin for risk stratification  -Monitor on telemetry  -Permissive hypertension    4/3: Pending MRI.  We appreciate further recommendations by neurology.    UPDATE: MRI is negative. Neurology mentions this is related to BPPV. He was instructed on Epley maneuvers and he will require outpatient follow up.

## 2021-04-13 NOTE — DOCUMENTATION QUERY
[facility]                                                                       Query Response Note      PATIENT:               SIM HERCULES  ACCT #:                  [id number]  MRN:                     [id number]  :                      1975  ADMIT DATE:       2021 1:31 PM  DISCH DATE:        4/3/2021 8:00 PM  RESPONDING  PROVIDER #:        [id number]           QUERY TEXT:    It is unclear if diagnosis of cerebral infarct remains valid diagnosis during this hospitalization. Based on treatment, clinical findings and risk factors, can this documentation be further clarified?    The patient's Clinical Indicators include:  Clinical indicators-  Per 4/3 Hospital Medicine PN: CT head negative for hemorrhage. CT perfusion study did reveal evolving L cerebellar infarct. CT angiogram showed small R vertebral artery w/ termination at posterior inferior cerebellar artery & aneurysm. No large vessel occlusion seen. Neurology consulted. Admitted for stroke work-up & MRI.  Per 4/3 Neurology PN: MRI negative for acute ischemia despite persistence of symptoms.  Symptoms are not r/t central ischemia & more suggestive of BPPV.  No clinical indication for primary stroke prevention regimen given lack of vascular risk factors.  Per DC Summary: CT head negative for hemorrhage. CT perfusion study was initially concerning for evolving L cerebellar infarct, however after discussing w/ neurologist he believes it might be artifact. MRI negative, reported: No evidence of acute territorial infarct, intracranial hemorrhage or mass lesion. Neurology believes his symptoms could be explained by Benign Paroxysmal Positional Vertigo.    Treatments-  CT of Head  MRI of Brain  Neurology consult & follow    Risks-  Dizziness; Ataxia; Blurry vision; N/V; Numbness to his left upper lip; BPPV    Thank you,  MOLLY Palomino RN  Connect via [employer]  Options  provided:   -- Cerebral infarct is ruled out   -- Cerebral infarct is ruled in   -- Unable to determine      Query created by: Jaylin Church on 4/6/2021 1:38 PM    RESPONSE TEXT:    Cerebral infarct is ruled out          Electronically signed by:  WENDY JOLLEY MD 4/13/2021 10:23 AM

## 2021-04-16 ENCOUNTER — HOSPITAL ENCOUNTER (OUTPATIENT)
Dept: LAB | Facility: MEDICAL CENTER | Age: 46
End: 2021-04-16
Attending: FAMILY MEDICINE
Payer: COMMERCIAL

## 2021-04-16 ENCOUNTER — OFFICE VISIT (OUTPATIENT)
Dept: MEDICAL GROUP | Facility: MEDICAL CENTER | Age: 46
End: 2021-04-16
Payer: COMMERCIAL

## 2021-04-16 VITALS
RESPIRATION RATE: 18 BRPM | WEIGHT: 196.43 LBS | DIASTOLIC BLOOD PRESSURE: 86 MMHG | HEIGHT: 69 IN | TEMPERATURE: 97.8 F | HEART RATE: 82 BPM | SYSTOLIC BLOOD PRESSURE: 123 MMHG | BODY MASS INDEX: 29.09 KG/M2 | OXYGEN SATURATION: 96 %

## 2021-04-16 DIAGNOSIS — E04.1 THYROID NODULE: ICD-10-CM

## 2021-04-16 DIAGNOSIS — Z13.21 ENCOUNTER FOR VITAMIN DEFICIENCY SCREENING: ICD-10-CM

## 2021-04-16 DIAGNOSIS — Z13.1 SCREENING FOR DIABETES MELLITUS: ICD-10-CM

## 2021-04-16 DIAGNOSIS — I67.1 ANTERIOR CEREBRAL ARTERY ANEURYSM: ICD-10-CM

## 2021-04-16 DIAGNOSIS — Z00.00 HEALTHCARE MAINTENANCE: ICD-10-CM

## 2021-04-16 DIAGNOSIS — M10.9 GOUT OF MULTIPLE SITES, UNSPECIFIED CAUSE, UNSPECIFIED CHRONICITY: ICD-10-CM

## 2021-04-16 DIAGNOSIS — E05.90 SUBCLINICAL HYPERTHYROIDISM: ICD-10-CM

## 2021-04-16 DIAGNOSIS — H81.10 BENIGN PAROXYSMAL POSITIONAL VERTIGO, UNSPECIFIED LATERALITY: ICD-10-CM

## 2021-04-16 PROBLEM — S62.001A CLOSED NONDISPLACED FRACTURE OF SCAPHOID BONE OF RIGHT WRIST: Status: RESOLVED | Noted: 2019-11-12 | Resolved: 2021-04-16

## 2021-04-16 PROBLEM — M79.675 GREAT TOE PAIN, LEFT: Status: RESOLVED | Noted: 2019-11-12 | Resolved: 2021-04-16

## 2021-04-16 PROBLEM — M25.532 LEFT WRIST PAIN: Status: RESOLVED | Noted: 2019-11-12 | Resolved: 2021-04-16

## 2021-04-16 LAB
ALBUMIN SERPL BCP-MCNC: 4.4 G/DL (ref 3.2–4.9)
ALBUMIN/GLOB SERPL: 1.8 G/DL
ALP SERPL-CCNC: 63 U/L (ref 30–99)
ALT SERPL-CCNC: 13 U/L (ref 2–50)
ANION GAP SERPL CALC-SCNC: 10 MMOL/L (ref 7–16)
AST SERPL-CCNC: 14 U/L (ref 12–45)
BILIRUB SERPL-MCNC: 0.6 MG/DL (ref 0.1–1.5)
BUN SERPL-MCNC: 10 MG/DL (ref 8–22)
CALCIUM SERPL-MCNC: 8.8 MG/DL (ref 8.4–10.2)
CHLORIDE SERPL-SCNC: 106 MMOL/L (ref 96–112)
CO2 SERPL-SCNC: 24 MMOL/L (ref 20–33)
CREAT SERPL-MCNC: 0.82 MG/DL (ref 0.5–1.4)
FASTING STATUS PATIENT QL REPORTED: NORMAL
GLOBULIN SER CALC-MCNC: 2.5 G/DL (ref 1.9–3.5)
GLUCOSE SERPL-MCNC: 104 MG/DL (ref 65–99)
POTASSIUM SERPL-SCNC: 4.3 MMOL/L (ref 3.6–5.5)
PROT SERPL-MCNC: 6.9 G/DL (ref 6–8.2)
SODIUM SERPL-SCNC: 140 MMOL/L (ref 135–145)
T4 FREE SERPL-MCNC: 1.51 NG/DL (ref 0.93–1.7)
TSH SERPL DL<=0.005 MIU/L-ACNC: 0.31 UIU/ML (ref 0.38–5.33)

## 2021-04-16 PROCEDURE — 99213 OFFICE O/P EST LOW 20 MIN: CPT | Performed by: FAMILY MEDICINE

## 2021-04-16 PROCEDURE — 80053 COMPREHEN METABOLIC PANEL: CPT

## 2021-04-16 PROCEDURE — 36415 COLL VENOUS BLD VENIPUNCTURE: CPT

## 2021-04-16 PROCEDURE — 84439 ASSAY OF FREE THYROXINE: CPT

## 2021-04-16 PROCEDURE — 82306 VITAMIN D 25 HYDROXY: CPT

## 2021-04-16 PROCEDURE — 84443 ASSAY THYROID STIM HORMONE: CPT

## 2021-04-16 PROCEDURE — 83036 HEMOGLOBIN GLYCOSYLATED A1C: CPT

## 2021-04-16 ASSESSMENT — ENCOUNTER SYMPTOMS
CHILLS: 0
WEAKNESS: 0
DIARRHEA: 0
DIZZINESS: 0
BLURRED VISION: 0
WEIGHT LOSS: 0
BRUISES/BLEEDS EASILY: 0
CONSTIPATION: 0
SHORTNESS OF BREATH: 0
NAUSEA: 0
DEPRESSION: 0
DOUBLE VISION: 0
FEVER: 0
MYALGIAS: 0
COUGH: 0
PALPITATIONS: 0

## 2021-04-16 ASSESSMENT — PATIENT HEALTH QUESTIONNAIRE - PHQ9: CLINICAL INTERPRETATION OF PHQ2 SCORE: 0

## 2021-04-16 ASSESSMENT — FIBROSIS 4 INDEX: FIB4 SCORE: 0.61

## 2021-04-16 NOTE — ASSESSMENT & PLAN NOTE
Patient was seen on 04/02 in the emergency room for vertigo and left lip numbness.  Patient ended up having a CTA of the head and neck completed, 2 cm right thyroid normal nodule was noted on the CTA of his neck.  No further work-up was completed while patient was in hospital.  Patient denies any constipation, difficulty with weight loss, or fatigue.

## 2021-04-16 NOTE — ASSESSMENT & PLAN NOTE
Patient was diagnosed with gout in 2019 and was prescribed allopurinol 300 mg daily.  He reports that he does not take this medication daily as he was recommended to.  He tries to take it whenever he does remember.  He currently denies any pain, redness, swelling to any joints at this time.  He does not report any dietary changes to help with his gout symptoms.

## 2021-04-16 NOTE — PROGRESS NOTES
Patient is TSH levels showed subclinical hyperthyroidism, still waiting for patient's ultrasound of his thyroid.  I went ahead and sent a referral to endocrinology for further evaluation.

## 2021-04-16 NOTE — ASSESSMENT & PLAN NOTE
Patient reports that he was recently seen in the emergency room for vertigo, they did notice a 2 mm aneurysm on the CT that he had completed.  Patient reports that his symptoms greatly improved after the provider did Epley maneuver as recommended by neurology.  Patient states after, his vertigo greatly improved.  He denies any recurrence of his vertigo symptoms.  Was sent home with a prescription for meclizine, he states he took about 12 tablets of that and stopped as his vertigo symptoms never returned.

## 2021-04-16 NOTE — ASSESSMENT & PLAN NOTE
Patient reports that he has an appointment set up for his COVID-19 vaccine series starting this week.

## 2021-04-16 NOTE — ASSESSMENT & PLAN NOTE
Patient was sent to the emergency room from urgent care for vertigo and left-sided lip numbness.    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  Patient currently denies any left-sided numbness, paralysis, difficulty with speech, confusion.

## 2021-04-16 NOTE — PROGRESS NOTES
Jhonny Snyder is a pleasant 45 y.o. male here to establish care and follow-up from previous hospital admission..    HPI:    Thyroid nodule  Patient was seen on 04/02 in the emergency room for vertigo and left lip numbness.  Patient ended up having a CTA of the head and neck completed, 2 cm right thyroid normal nodule was noted on the CTA of his neck.  No further work-up was completed while patient was in hospital.  Patient denies any constipation, difficulty with weight loss, or fatigue.    Healthcare maintenance  Patient reports that he has an appointment set up for his COVID-19 vaccine series starting this week.    Gout of multiple sites  Patient was diagnosed with gout in 2019 and was prescribed allopurinol 300 mg daily.  He reports that he does not take this medication daily as he was recommended to.  He tries to take it whenever he does remember.  He currently denies any pain, redness, swelling to any joints at this time.  He does not report any dietary changes to help with his gout symptoms.    BPPV (benign paroxysmal positional vertigo)  Patient reports that he was recently seen in the emergency room for vertigo, they did notice a 2 mm aneurysm on the CT that he had completed.  Patient reports that his symptoms greatly improved after the provider did Epley maneuver as recommended by neurology.  Patient states after, his vertigo greatly improved.  He denies any recurrence of his vertigo symptoms.  Was sent home with a prescription for meclizine, he states he took about 12 tablets of that and stopped as his vertigo symptoms never returned.    Anterior cerebral artery aneurysm  Patient was sent to the emergency room from urgent care for vertigo and left-sided lip numbness.    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  Patient currently denies any left-sided numbness, paralysis, difficulty with speech, confusion.      Current medicines (including changes today)  Current Outpatient Medications   Medication Sig Dispense Refill   • Naproxen Sodium (ALEVE PO) Take  by mouth.     • allopurinol (ZYLOPRIM) 300 MG Tab allopurinol 300 mg tablet 90 Tab 2     No current facility-administered medications for this visit.       Past Medical/ Surgical History  He  has a past medical history of Closed nondisplaced fracture of scaphoid bone of right wrist (2019), Gout, and Great toe pain, left (2019).  He  has a past surgical history that includes hand surgery (Right).    Social History  Social History     Tobacco Use   • Smoking status: Former Smoker     Packs/day: 0.50     Years: 25.00     Pack years: 12.50     Types: Cigarettes     Quit date: 2016     Years since quittin.2   • Smokeless tobacco: Never Used   • Tobacco comment: Started in late 30's 1PP/ 2 days.   Substance Use Topics   • Alcohol use: Yes     Alcohol/week: 3.6 oz     Types: 6 Cans of beer per week     Comment: occasional   • Drug use: No     Social History     Social History Narrative   • Not on file        Family History  Family History   Problem Relation Age of Onset   • Cancer Mother         ?Unspecified   • Hypertension Father    • Diabetes Father    • No Known Problems Sister    • No Known Problems Brother    • No Known Problems Son      Family Status   Relation Name Status   • Mo  Alive   • Fa  Alive   • Sis Imelda Alive   • Bro Carlos Alive   • Son Jcarlos Alive         Review of Systems   Constitutional: Negative for chills, fever and weight loss.   HENT: Negative for hearing loss.    Eyes: Negative for blurred vision and double vision.   Respiratory: Negative for cough and shortness of breath.    Cardiovascular: Negative for chest pain, palpitations and leg swelling.   Gastrointestinal: Negative for constipation, diarrhea and  "nausea.   Genitourinary: Negative.    Musculoskeletal: Negative for myalgias.   Skin: Negative for rash.   Neurological: Negative for dizziness and weakness.   Endo/Heme/Allergies: Does not bruise/bleed easily.   Psychiatric/Behavioral: Negative for depression.         Objective:     /86   Pulse 82   Temp 36.6 °C (97.8 °F) (Temporal)   Resp 18   Ht 1.753 m (5' 9\")   Wt 89.1 kg (196 lb 6.9 oz)   SpO2 96%  Body mass index is 29.01 kg/m².    Physical Exam   Constitutional: He is oriented to person, place, and time and well-developed, well-nourished, and in no distress. No distress.   HENT:   Head: Normocephalic and atraumatic.   Right Ear: External ear normal.   Left Ear: External ear normal.   Eyes: Pupils are equal, round, and reactive to light. Conjunctivae are normal.   Neck: Trachea normal. No tracheal tenderness present. No tracheal deviation present. No thyromegaly present.   Cardiovascular: Normal rate and regular rhythm. Exam reveals no gallop and no friction rub.   No murmur heard.  Pulmonary/Chest: Effort normal and breath sounds normal. He has no wheezes. He has no rales.   Abdominal: Soft. Bowel sounds are normal. There is no abdominal tenderness.   Musculoskeletal:      Cervical back: Normal range of motion and neck supple.   Neurological: He is alert and oriented to person, place, and time. Gait normal.   Skin: Skin is warm and dry. No rash noted.   Psychiatric: Affect normal.        Labs:  Most recent labs from 04/2021 reviewed with patient.  Assessment and Plan:   The following treatment plan was discussed    1. Anterior cerebral artery aneurysm  New to the patient.  Neurology recommendation per hospital notes is to have patient follow-up with neurosurgery regarding his aneurysm.  Referral placed, this was discussed with the patient.  I also discussed with the patient important signs and symptoms of when to seek medical treatment in the emergency room.  Patient acknowledged what to look out " for.  - REFERRAL TO NEUROSURGERY    2. Thyroid nodule  New to the patient.  Patient reported that he does not have any sensation of fullness, masses he can palpate, or other symptoms of possible thyroid dysfunction.  We will obtain an ultrasound and a TSH.  Will follow recommendations based on ultrasound findings.  - TSH WITH REFLEX TO FT4; Future  - US-THYROID; Future    3. Benign paroxysmal positional vertigo, unspecified laterality  New to the patient.  We discussed treatments and maneuvers at home he can do to help with vertigo symptoms.  Continuation of vertigo we will go ahead and refer him to vestibular rehab.    4. Gout of multiple sites, unspecified cause, unspecified chronicity  Chronic, controlled.  I recommended that the patient continue taking his allopurinol as prescribed, daily.  Patient states that he will try to take this medication as it was previously prescribed.  We did discuss signs and symptoms of to look out for for potential gout flares.    5. Screening for diabetes mellitus  New to the patient.  Patient had some elevation to his glucose while in the emergency room.  He does have a positive family history for diabetes, we will obtain a hemoglobin A1c and repeat CMP.  This was discussed with the patient.  - HEMOGLOBIN A1C; Future  - Comp Metabolic Panel; Future    6. Encounter for vitamin deficiency screening  - VITAMIN D,25 HYDROXY; Future    7. Healthcare maintenance  Patient has a COVID-19 vaccine appointment scheduled, did stress the importance of this.    Records requested.  Followup: Return in about 4 weeks (around 5/14/2021) for blood work and follow-up.    Please note that this dictation was created using voice recognition software. I have made every reasonable attempt to correct obvious errors, but I expect that there are errors of grammar and possibly content that I did not discover before finalizing the note.

## 2021-04-17 LAB
25(OH)D3 SERPL-MCNC: 15 NG/ML (ref 30–80)
EST. AVERAGE GLUCOSE BLD GHB EST-MCNC: 100 MG/DL
HBA1C MFR BLD: 5.1 % (ref 4–5.6)

## 2021-04-27 ENCOUNTER — IMMUNIZATION (OUTPATIENT)
Dept: FAMILY PLANNING/WOMEN'S HEALTH CLINIC | Facility: IMMUNIZATION CENTER | Age: 46
End: 2021-04-27
Payer: COMMERCIAL

## 2021-04-27 DIAGNOSIS — Z23 ENCOUNTER FOR VACCINATION: Primary | ICD-10-CM

## 2021-04-27 PROCEDURE — 0001A PFIZER SARS-COV-2 VACCINE: CPT

## 2021-04-27 PROCEDURE — 91300 PFIZER SARS-COV-2 VACCINE: CPT

## 2021-05-07 ENCOUNTER — HOSPITAL ENCOUNTER (OUTPATIENT)
Dept: RADIOLOGY | Facility: MEDICAL CENTER | Age: 46
End: 2021-05-07
Attending: FAMILY MEDICINE
Payer: COMMERCIAL

## 2021-05-07 DIAGNOSIS — E04.1 THYROID NODULE: ICD-10-CM

## 2021-05-07 PROCEDURE — 76536 US EXAM OF HEAD AND NECK: CPT

## 2021-05-19 ENCOUNTER — PATIENT OUTREACH (OUTPATIENT)
Dept: SCHEDULING | Facility: IMAGING CENTER | Age: 46
End: 2021-05-19

## 2021-05-20 ENCOUNTER — IMMUNIZATION (OUTPATIENT)
Dept: FAMILY PLANNING/WOMEN'S HEALTH CLINIC | Facility: IMMUNIZATION CENTER | Age: 46
End: 2021-05-20
Attending: INTERNAL MEDICINE
Payer: COMMERCIAL

## 2021-05-20 DIAGNOSIS — Z23 ENCOUNTER FOR VACCINATION: Primary | ICD-10-CM

## 2021-05-20 PROCEDURE — 91300 PFIZER SARS-COV-2 VACCINE: CPT

## 2021-05-20 PROCEDURE — 0002A PFIZER SARS-COV-2 VACCINE: CPT

## 2021-09-18 ENCOUNTER — OFFICE VISIT (OUTPATIENT)
Dept: URGENT CARE | Facility: PHYSICIAN GROUP | Age: 46
End: 2021-09-18
Payer: COMMERCIAL

## 2021-09-18 VITALS
HEIGHT: 69 IN | BODY MASS INDEX: 26.66 KG/M2 | OXYGEN SATURATION: 100 % | WEIGHT: 180 LBS | HEART RATE: 72 BPM | TEMPERATURE: 97.3 F | RESPIRATION RATE: 18 BRPM | SYSTOLIC BLOOD PRESSURE: 124 MMHG | DIASTOLIC BLOOD PRESSURE: 82 MMHG

## 2021-09-18 DIAGNOSIS — M10.9 ACUTE GOUT OF RIGHT KNEE, UNSPECIFIED CAUSE: ICD-10-CM

## 2021-09-18 PROCEDURE — 99213 OFFICE O/P EST LOW 20 MIN: CPT | Performed by: NURSE PRACTITIONER

## 2021-09-18 RX ORDER — ALLOPURINOL 300 MG/1
TABLET ORAL
Qty: 90 TABLET | Refills: 0 | Status: SHIPPED | OUTPATIENT
Start: 2021-09-18

## 2021-09-18 RX ORDER — PREDNISONE 20 MG/1
TABLET ORAL
Qty: 10 TABLET | Refills: 0 | Status: SHIPPED | OUTPATIENT
Start: 2021-09-18 | End: 2021-10-13

## 2021-09-18 RX ORDER — INDOMETHACIN 50 MG/1
50 CAPSULE ORAL 3 TIMES DAILY
Qty: 15 CAPSULE | Refills: 0 | Status: SHIPPED | OUTPATIENT
Start: 2021-09-18 | End: 2021-09-23

## 2021-09-18 ASSESSMENT — FIBROSIS 4 INDEX: FIB4 SCORE: 0.82

## 2021-09-18 NOTE — PROGRESS NOTES
Subjective     Jhonny Snyder is a 46 y.o. male who presents with Medication Refill (gouat medication and indomethacin if possible. )            Pt reports he suspects a gout flare to his right knee that started almost one month ago. Initially there was redness, swelling and pain. Now the redness is improving but it is still painful and slightly swollen. Taking aleve as needed for pain with some relief. He has experienced gout flares before to his right great toe. He was provided with allopurinol for preventative but has not been taking it. No fevers or other joint pain.      Review of Systems   Musculoskeletal: Positive for joint pain (right knee).   All other systems reviewed and are negative.         Past Medical History:   Diagnosis Date   • Closed nondisplaced fracture of scaphoid bone of right wrist 2019   • Gout    • Great toe pain, left 2019      Past Surgical History:   Procedure Laterality Date   • HAND SURGERY Right       Social History     Socioeconomic History   • Marital status: Single     Spouse name: Not on file   • Number of children: Not on file   • Years of education: Not on file   • Highest education level: Not on file   Occupational History   • Not on file   Tobacco Use   • Smoking status: Former Smoker     Packs/day: 0.50     Years: 25.00     Pack years: 12.50     Types: Cigarettes     Quit date: 2016     Years since quittin.7   • Smokeless tobacco: Never Used   • Tobacco comment: Started in late 30's 1PP/ 2 days.   Vaping Use   • Vaping Use: Never used   Substance and Sexual Activity   • Alcohol use: Yes     Alcohol/week: 3.6 oz     Types: 6 Cans of beer per week     Comment: occasional   • Drug use: No   • Sexual activity: Yes     Comment: Single , work as Consumer chair.   Other Topics Concern   • Not on file   Social History Narrative   • Not on file     Social Determinants of Health     Financial Resource Strain:    • Difficulty of Paying Living  "Expenses:    Food Insecurity:    • Worried About Running Out of Food in the Last Year:    • Ran Out of Food in the Last Year:    Transportation Needs:    • Lack of Transportation (Medical):    • Lack of Transportation (Non-Medical):    Physical Activity:    • Days of Exercise per Week:    • Minutes of Exercise per Session:    Stress:    • Feeling of Stress :    Social Connections:    • Frequency of Communication with Friends and Family:    • Frequency of Social Gatherings with Friends and Family:    • Attends Alev[employer] Services:    • Active Member of Clubs or Organizations:    • Attends Club or Organization Meetings:    • Marital Status:    Intimate Partner Violence:    • Fear of Current or Ex-Partner:    • Emotionally Abused:    • Physically Abused:    • Sexually Abused:          Objective     /82   Pulse 72   Temp 36.3 °C (97.3 °F) (Temporal)   Resp 18   Ht 1.753 m (5' 9\")   Wt 81.6 kg (180 lb)   SpO2 100%   BMI 26.58 kg/m²      Physical Exam  Vitals and nursing note reviewed.   Constitutional:       Appearance: Normal appearance.   HENT:      Head: Normocephalic and atraumatic.      Nose: Nose normal.      Mouth/Throat:      Mouth: Mucous membranes are moist.      Pharynx: Oropharynx is clear.   Eyes:      Extraocular Movements: Extraocular movements intact.      Pupils: Pupils are equal, round, and reactive to light.   Cardiovascular:      Rate and Rhythm: Normal rate and regular rhythm.   Pulmonary:      Effort: Pulmonary effort is normal.   Musculoskeletal:         General: Normal range of motion.      Cervical back: Normal range of motion and neck supple.      Right knee: Swelling present.        Legs:    Skin:     General: Skin is warm and dry.      Capillary Refill: Capillary refill takes less than 2 seconds.   Neurological:      General: No focal deficit present.      Mental Status: He is alert and oriented to person, place, and time. Mental status is at baseline.   Psychiatric:         Mood " and Affect: Mood normal.         Thought Content: Thought content normal.         Judgment: Judgment normal.                             Assessment & Plan        1. Acute gout of right knee, unspecified cause  - indomethacin (INDOCIN) 50 MG Cap; Take 1 Capsule by mouth 3 times a day for 5 days.  Dispense: 15 Capsule; Refill: 0  - predniSONE (DELTASONE) 20 MG Tab; Take 2 tabs PO daily for 5 days  Dispense: 10 Tablet; Refill: 0  - allopurinol (ZYLOPRIM) 300 MG Tab; allopurinol 300 mg tablet  Dispense: 90 Tablet; Refill: 0       Take full course of pred and indocin  May take tylenol as needed for additional pain relief  Ice compresses to help with pain  Supportive care, differential diagnoses, and indications for immediate follow-up discussed with patient.    Pathogenesis of diagnosis discussed including typical length and natural progression.      Instructed to return to  or nearest emergency department if symptoms fail to improve, for any change in condition, further concerns, or new concerning symptoms.  Patient states understanding of the plan of care and discharge instructions.

## 2021-09-30 ENCOUNTER — OFFICE VISIT (OUTPATIENT)
Dept: MEDICAL GROUP | Facility: MEDICAL CENTER | Age: 46
End: 2021-09-30

## 2021-09-30 ENCOUNTER — HOSPITAL ENCOUNTER (OUTPATIENT)
Dept: LAB | Facility: MEDICAL CENTER | Age: 46
End: 2021-09-30
Attending: FAMILY MEDICINE
Payer: COMMERCIAL

## 2021-09-30 VITALS
HEART RATE: 70 BPM | HEIGHT: 69 IN | BODY MASS INDEX: 28.38 KG/M2 | SYSTOLIC BLOOD PRESSURE: 138 MMHG | DIASTOLIC BLOOD PRESSURE: 74 MMHG | OXYGEN SATURATION: 98 % | TEMPERATURE: 98.3 F | WEIGHT: 191.62 LBS

## 2021-09-30 DIAGNOSIS — M25.561 ACUTE PAIN OF RIGHT KNEE: ICD-10-CM

## 2021-09-30 LAB — URATE SERPL-MCNC: 7 MG/DL (ref 2.5–8.3)

## 2021-09-30 PROCEDURE — 84550 ASSAY OF BLOOD/URIC ACID: CPT

## 2021-09-30 PROCEDURE — 99213 OFFICE O/P EST LOW 20 MIN: CPT | Performed by: FAMILY MEDICINE

## 2021-09-30 PROCEDURE — 36415 COLL VENOUS BLD VENIPUNCTURE: CPT

## 2021-09-30 RX ORDER — IBUPROFEN 800 MG/1
800 TABLET ORAL EVERY 8 HOURS PRN
Qty: 30 TABLET | Refills: 1 | Status: SHIPPED | OUTPATIENT
Start: 2021-09-30 | End: 2021-10-13

## 2021-09-30 ASSESSMENT — FIBROSIS 4 INDEX: FIB4 SCORE: 0.82

## 2021-09-30 ASSESSMENT — ENCOUNTER SYMPTOMS
DEPRESSION: 0
WEIGHT LOSS: 0
WEAKNESS: 0
DIZZINESS: 0
COUGH: 0
CHILLS: 0
SHORTNESS OF BREATH: 0
FEVER: 0
ABDOMINAL PAIN: 0
MYALGIAS: 0
PALPITATIONS: 0

## 2021-09-30 NOTE — ASSESSMENT & PLAN NOTE
Patient reports that he is originally seen on 09/18 for possible gout flare due to red hot right knee.  At the time he was treated for gout started on indomethacin, refill of the allopurinol, and prednisone 5-day course.  Patient reports that his symptoms improved mildly but has since not gone away.  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  Its that the swelling continues but the redness and heat has improved.  He has not restarted his gout medication at this time, does not feel that this is gout.  Denies weakness, pain in the joint, knee laxity.

## 2021-09-30 NOTE — PROGRESS NOTES
Jhonny Snyder is a pleasant 46 y.o. male here for   Chief Complaint   Patient presents with   • Knee Pain     x1 mouth, swollen, in pain      HPI:   Acute pain of right knee  Patient reports that he is originally seen on 09/18 for possible gout flare due to red hot right knee.  At the time he was treated for gout started on indomethacin, refill of the allopurinol, and prednisone 5-day course.  Patient reports that his symptoms improved mildly but has since not gone away.  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  Its that the swelling continues but the redness and heat has improved.  He has not restarted his gout medication at this time, does not feel that this is gout.  Denies weakness, pain in the joint, knee laxity.      Health Maintenance  Immunization: Patient is due for his influenza vaccine.    Current Medicines (including changes today)  Current Outpatient Medications   Medication Sig Dispense Refill   • ibuprofen (MOTRIN) 800 MG Tab Take 1 Tablet by mouth every 8 hours as needed for Moderate Pain or Inflammation. 30 Tablet 1   • predniSONE (DELTASONE) 20 MG Tab Take 2 tabs PO daily for 5 days 10 Tablet 0   • allopurinol (ZYLOPRIM) 300 MG Tab allopurinol 300 mg tablet 90 Tablet 0     No current facility-administered medications for this visit.     Past Medical/ Surgical History  He  has a past medical history of Closed nondisplaced fracture of scaphoid bone of right wrist (11/12/2019), Gout, and Great toe pain, left (11/12/2019).  He  has a past surgical history that includes hand surgery (Right).    Review of Systems   Constitutional: Negative for chills, fever, malaise/fatigue and weight loss.   Respiratory: Negative for cough and shortness of breath.    Cardiovascular: Negative for chest pain and palpitations.   Gastrointestinal: Negative for abdominal pain.   Genitourinary: Negative.    Musculoskeletal: Positive for  "joint pain (Right knee pain and swelling). Negative for myalgias.   Skin: Negative for rash.   Neurological: Negative for dizziness and weakness.   Psychiatric/Behavioral: Negative for depression.         Objective:     /74 (BP Location: Left arm, Patient Position: Sitting, BP Cuff Size: Adult)   Pulse 70   Temp 36.8 °C (98.3 °F) (Temporal)   Ht 1.753 m (5' 9\")   Wt 86.9 kg (191 lb 10 oz)   SpO2 98%  Body mass index is 28.3 kg/m².    Physical Exam  Constitutional:       General: He is not in acute distress.  HENT:      Head: Normocephalic and atraumatic.   Eyes:      Conjunctiva/sclera: Conjunctivae normal.      Pupils: Pupils are equal, round, and reactive to light.   Pulmonary:      Effort: Pulmonary effort is normal. No respiratory distress.   Abdominal:      General: There is no distension.   Musculoskeletal:      Cervical back: Normal range of motion and neck supple.      Right knee: Swelling present. No tenderness.      Instability Tests: Anterior drawer test negative. Posterior drawer test negative. Anterior Lachman test negative.      Left knee: Normal.   Skin:     General: Skin is warm and dry.      Findings: No rash.   Neurological:      Mental Status: He is alert and oriented to person, place, and time.      Gait: Gait is intact.   Psychiatric:         Mood and Affect: Affect normal.        Imaging:  No recent imaging to review    Labs  No recent labs to review  Assessment and Plan:   The following treatment plan was discussed    1. Acute pain of right knee  Acute. We will go ahead and check patient's uric acid level, I did recommend that he continue with his gout medication. We will go ahead and obtain a knee x-ray to examine the joint space. I did place a referral to sports medicine for further management of his right knee discomfort. I recommended that the patient take ibuprofen 800 mg every 8 hours as needed for pain or inflammation. I recommended that the patient do not stress his knee for " prolonged periods of time. If he is able to get off of his knee to provide him relief during work that would be best. Did not recommend immobilization.  - URIC ACID; Future  - DX-KNEE COMPLETE 4+ RIGHT; Future  - REFERRAL TO SPORTS MEDICINE  - ibuprofen (MOTRIN) 800 MG Tab; Take 1 Tablet by mouth every 8 hours as needed for Moderate Pain or Inflammation.  Dispense: 30 Tablet; Refill: 1      Followup: Return if symptoms worsen or fail to improve.      Please note that this dictation was created using voice recognition software. I have made every reasonable attempt to correct obvious errors, but I expect that there are errors of grammar and possibly content that I did not discover before finalizing the note.

## 2021-10-07 ENCOUNTER — HOSPITAL ENCOUNTER (OUTPATIENT)
Dept: RADIOLOGY | Facility: MEDICAL CENTER | Age: 46
End: 2021-10-07
Attending: FAMILY MEDICINE
Payer: COMMERCIAL

## 2021-10-07 DIAGNOSIS — M25.561 ACUTE PAIN OF RIGHT KNEE: ICD-10-CM

## 2021-10-07 PROCEDURE — 73564 X-RAY EXAM KNEE 4 OR MORE: CPT | Mod: RT

## 2021-10-13 ENCOUNTER — OFFICE VISIT (OUTPATIENT)
Dept: SPORTS MEDICINE | Facility: CLINIC | Age: 46
End: 2021-10-13
Payer: COMMERCIAL

## 2021-10-13 VITALS
HEART RATE: 72 BPM | BODY MASS INDEX: 28.14 KG/M2 | RESPIRATION RATE: 14 BRPM | SYSTOLIC BLOOD PRESSURE: 122 MMHG | DIASTOLIC BLOOD PRESSURE: 80 MMHG | OXYGEN SATURATION: 98 % | TEMPERATURE: 98 F | WEIGHT: 190 LBS | HEIGHT: 69 IN

## 2021-10-13 DIAGNOSIS — M25.561 ACUTE PAIN OF RIGHT KNEE: ICD-10-CM

## 2021-10-13 DIAGNOSIS — M25.061 HEMARTHROSIS OF RIGHT KNEE: ICD-10-CM

## 2021-10-13 PROCEDURE — 99214 OFFICE O/P EST MOD 30 MIN: CPT | Performed by: FAMILY MEDICINE

## 2021-10-13 ASSESSMENT — ENCOUNTER SYMPTOMS
FEVER: 0
COUGH: 0
VOMITING: 0

## 2021-10-13 ASSESSMENT — FIBROSIS 4 INDEX: FIB4 SCORE: 0.82

## 2021-10-14 NOTE — PROGRESS NOTES
Subjective:     Jhonny Snyder is a 46 y.o. male who presents for Knee Pain (The patient is here for right knee pain, swelling. The patient did have fluid removed, which helped. Still having trouble with prolong standing and bending the knee. )    HPI  Pt presents for evaluation of an acute problem  Patient with right knee pain and swelling for a little over a month  Initially seen in urgent care 4 weeks ago and treated with combination prednisone and indomethacin  Was also started on allopurinol at urgent care visit  Was seen again by PCP who obtained x-ray which showed joint effusion and also was treated with ibuprofen  Was then seen at [facility] 5 days ago and had joint aspiration     Joint aspiration showed the following:  Color - red  Clarity - turbid  Nucleated cells - 3413  RBC - too numerous to count  Gram stain - Few white blood cells and no organisms seen  Fluid culture - No growth in 72 hours  Crystals -no crystals seen under normal or polarized light    Pain is more in the anterior knee  Knee feels swollen  Never had erythema or rash over the knee  No trauma or injury that he can recall  Pain is worse when weightbearing    Review of Systems   Constitutional: Negative for fever.   Respiratory: Negative for cough.    Gastrointestinal: Negative for vomiting.   Skin: Negative for rash.     PMH:  has a past medical history of Closed nondisplaced fracture of scaphoid bone of right wrist (11/12/2019), Gout, and Great toe pain, left (11/12/2019).  MEDS:   Current Outpatient Medications:   •  Naproxen Sodium (ALEVE PO), Take 1 Tablet by mouth every day., Disp: , Rfl:   •  ibuprofen (MOTRIN) 800 MG Tab, Take 1 Tablet by mouth every 8 hours as needed for Moderate Pain or Inflammation., Disp: 30 Tablet, Rfl: 1  •  allopurinol (ZYLOPRIM) 300 MG Tab, allopurinol 300 mg tablet, Disp: 90 Tablet, Rfl: 0  •  predniSONE (DELTASONE) 20 MG Tab, Take 2 tabs PO daily for 5 days (Patient not taking:  "Reported on 10/13/2021), Disp: 10 Tablet, Rfl: 0  ALLERGIES: No Known Allergies  SURGHX:   Past Surgical History:   Procedure Laterality Date   • HAND SURGERY Right      SOCHX:  reports that he quit smoking about 5 years ago. His smoking use included cigarettes. He has a 12.50 pack-year smoking history. He has never used smokeless tobacco. He reports current alcohol use of about 3.6 oz of alcohol per week. He reports that he does not use drugs.     Objective:   /80 (BP Location: Left arm, Patient Position: Sitting, BP Cuff Size: Adult)   Pulse 72   Temp 36.7 °C (98 °F) (Temporal)   Resp 14   Ht 1.753 m (5' 9\")   Wt 86.2 kg (190 lb)   SpO2 98%   BMI 28.06 kg/m²     Physical Exam    Right knee  Appearance - No bruising, erythema, or deformity appreciated  Palpation - No tenderness to palpation along joint lines, patellar tendon, hamstring tendons, or quads.  No palpable effusion.  ROM - FROM without crepitus  Strength - 5/5 throughout  Neuro - Sensation equal and intact bilaterally  Special testing - No laxity or pain with varus/valgus stress, neg anterior drawer, neg posterior drawer, neg Lachman's, neg Otf's, neg patellar apprehension test    Right knee x-ray 10/7/2021  1.  Moderate to large right knee joint effusion. If there is concern for internal derangement, nonemergent MRI can be performed. If there is concern for septic arthritis, recommend joint aspiration.  2.  Ossification projecting over the patellar tendon may represent sequela of prior injury.  3.  No acute osseous abnormality.    Assessment/Plan:   Assessment    1. Hemarthrosis of right knee  - MR-KNEE-W/O RIGHT; Future    2. Acute pain of right knee    Patient with hemarthrosis of right knee.  Had aspiration done 5 days ago and starting to have some swelling to recur.  Fluid studies do not appear to be gout, pseudogout, autoimmune, or septic arthritis.  Advised patient try to stay off his foot as much as possible, wrap with Ace " bandage to help swelling a little, and use topical diclofenac for pain.  Will obtain MRI to better evaluate.  Concern for possible PVNS or other lesions such as a synovial hemangioma.  Effusion is currently small and did not recommend repeat aspiration today, though would recommend aspiration if effusion growing larger.

## 2021-10-25 ENCOUNTER — HOSPITAL ENCOUNTER (OUTPATIENT)
Dept: RADIOLOGY | Facility: MEDICAL CENTER | Age: 46
End: 2021-10-25
Attending: FAMILY MEDICINE
Payer: COMMERCIAL

## 2021-10-25 DIAGNOSIS — M25.061 HEMARTHROSIS OF RIGHT KNEE: ICD-10-CM

## 2021-10-25 PROCEDURE — 73721 MRI JNT OF LWR EXTRE W/O DYE: CPT | Mod: RT

## 2021-10-27 ENCOUNTER — OFFICE VISIT (OUTPATIENT)
Dept: SPORTS MEDICINE | Facility: CLINIC | Age: 46
End: 2021-10-27
Payer: COMMERCIAL

## 2021-10-27 VITALS
DIASTOLIC BLOOD PRESSURE: 80 MMHG | WEIGHT: 190 LBS | TEMPERATURE: 98.2 F | RESPIRATION RATE: 14 BRPM | BODY MASS INDEX: 28.14 KG/M2 | HEIGHT: 69 IN | HEART RATE: 70 BPM | OXYGEN SATURATION: 98 % | SYSTOLIC BLOOD PRESSURE: 128 MMHG

## 2021-10-27 DIAGNOSIS — M25.061 HEMARTHROSIS OF RIGHT KNEE: ICD-10-CM

## 2021-10-27 DIAGNOSIS — S83.289A ACUTE LATERAL MENISCAL TEAR, INITIAL ENCOUNTER: ICD-10-CM

## 2021-10-27 DIAGNOSIS — M25.561 ACUTE PAIN OF RIGHT KNEE: ICD-10-CM

## 2021-10-27 PROCEDURE — 99212 OFFICE O/P EST SF 10 MIN: CPT | Performed by: FAMILY MEDICINE

## 2021-10-27 ASSESSMENT — FIBROSIS 4 INDEX: FIB4 SCORE: 0.82

## 2021-10-27 ASSESSMENT — ENCOUNTER SYMPTOMS: FEVER: 0

## 2021-10-28 NOTE — PROGRESS NOTES
"Subjective:     Jhonny Snyder is a 46 y.o. male who presents for Knee Pain (The patient is here for a recheck on the right knee and to do an aspiration. )    HPI  Pt presents for recheck of right knee, MRI results, and possible aspiration  Patient previously seen for hemarthrosis  Has been drained once and recurred which prompted MRI to rule out PVNS or other bleeding lesion  MRI showed large lateral meniscus tear without other lesions  Swelling has actually decreased quite a bit  Patient still feels there is a little swelling in the knee which he feels more with end range flexion  Still having pain when walking    Review of Systems   Constitutional: Negative for fever.   Skin: Negative for rash.     PMH:  has a past medical history of Closed nondisplaced fracture of scaphoid bone of right wrist (11/12/2019), Gout, and Great toe pain, left (11/12/2019).  MEDS:   Current Outpatient Medications:   •  Naproxen Sodium (ALEVE PO), Take 1 Tablet by mouth every day., Disp: , Rfl:   •  diclofenac sodium (VOLTAREN) 1 % Gel, Apply 2 g topically 4 times a day as needed., Disp: 100 g, Rfl: 0  •  allopurinol (ZYLOPRIM) 300 MG Tab, allopurinol 300 mg tablet, Disp: 90 Tablet, Rfl: 0  ALLERGIES: No Known Allergies  SURGHX:   Past Surgical History:   Procedure Laterality Date   • HAND SURGERY Right      SOCHX:  reports that he quit smoking about 5 years ago. His smoking use included cigarettes. He has a 12.50 pack-year smoking history. He has never used smokeless tobacco. He reports current alcohol use of about 3.6 oz of alcohol per week. He reports that he does not use drugs.     Objective:   /80 (BP Location: Left arm, Patient Position: Sitting, BP Cuff Size: Adult)   Pulse 70   Temp 36.8 °C (98.2 °F) (Temporal)   Resp 14   Ht 1.753 m (5' 9\")   Wt 86.2 kg (190 lb)   SpO2 98%   BMI 28.06 kg/m²     Physical Exam  Constitutional:       General: He is not in acute distress.     Appearance: He is " well-developed. He is not diaphoretic.   Pulmonary:      Effort: Pulmonary effort is normal.   Neurological:      Mental Status: He is alert.     Right knee  Appearance - No bruising, erythema, or deformity appreciated  Palpation - Small knee effusion present   ROM - FROM without crepitus    Assessment/Plan:   Assessment    1. Hemarthrosis of right knee    2. Acute pain of right knee    3. Acute lateral meniscal tear, initial encounter    Had a discussion about his MRI results.  Patient with a large lateral meniscal tear.  Did recommend surgical consult, however patient declines.  He states he wants to avoid surgery if at all possible and does not wish to discuss his options with the surgeon.  Patient wanting repeat aspiration, however advised that there is only a very small effusion and does seem to be improving.  Ultrasound shows only small amount of drainable fluid and did not recommend repeat aspiration attempt.  Reviewed pros and cons of repeat aspiration and patient ultimately decided against it.  We will continue to use topical Voltaren, knee brace, and heat on the knee.  If resolving, no further follow-up needed.  If knee swells up again, will return for aspiration.  If there is no swelling and knee still painful, would recommend surgical consult.

## 2022-02-04 ENCOUNTER — OFFICE VISIT (OUTPATIENT)
Dept: MEDICAL GROUP | Facility: MEDICAL CENTER | Age: 47
End: 2022-02-04
Payer: COMMERCIAL

## 2022-02-04 VITALS
WEIGHT: 187.61 LBS | OXYGEN SATURATION: 98 % | TEMPERATURE: 98.5 F | SYSTOLIC BLOOD PRESSURE: 134 MMHG | DIASTOLIC BLOOD PRESSURE: 76 MMHG | BODY MASS INDEX: 27.79 KG/M2 | HEART RATE: 68 BPM | HEIGHT: 69 IN

## 2022-02-04 DIAGNOSIS — M25.561 ACUTE PAIN OF RIGHT KNEE: ICD-10-CM

## 2022-02-04 PROCEDURE — 99213 OFFICE O/P EST LOW 20 MIN: CPT | Performed by: FAMILY MEDICINE

## 2022-02-04 ASSESSMENT — PATIENT HEALTH QUESTIONNAIRE - PHQ9: CLINICAL INTERPRETATION OF PHQ2 SCORE: 0

## 2022-02-04 ASSESSMENT — FIBROSIS 4 INDEX: FIB4 SCORE: 0.82

## 2022-02-04 ASSESSMENT — ENCOUNTER SYMPTOMS
ABDOMINAL PAIN: 0
FEVER: 0
MYALGIAS: 0
WEIGHT LOSS: 0
PALPITATIONS: 0
WEAKNESS: 0
SHORTNESS OF BREATH: 0
COUGH: 0
CHILLS: 0
DEPRESSION: 0
DIZZINESS: 0

## 2022-02-05 NOTE — PROGRESS NOTES
Jhonny Snyder is a pleasant 46 y.o. male here for   Chief Complaint   Patient presents with   • Knee Pain     late aug   • Referral Needed     ortho      HPI:  Acute pain of right knee  In early October patient was seen at the [facility] and had fluid drained from his knee.  He then followed up with sports medicine who completed an MRI.  The MRI showed residual fluid that was left in the joint but as it was insignificant they chose not to repeat the aspiration.  It was recommended that he use a knee brace, NSAIDs and heat for comfort.  Started to notice increase in swelling to the right knee over the last few days, pain started yesterday.  Has been taking Aleve which has been very helpful for the discomfort.  Would like a referral to orthopedics to have the residual fluid drained off.  Denies any redness to the joints, heat, numbness or tingling.      Health Maintenance  Immunization: Patient is due for his influenza and Covid booster.    Current Medicines (including changes today)  Current Outpatient Medications   Medication Sig Dispense Refill   • Naproxen Sodium (ALEVE PO) Take 1 Tablet by mouth every day.     • diclofenac sodium (VOLTAREN) 1 % Gel Apply 2 g topically 4 times a day as needed. 100 g 0   • allopurinol (ZYLOPRIM) 300 MG Tab allopurinol 300 mg tablet 90 Tablet 0     No current facility-administered medications for this visit.     Past Medical/ Surgical History  He  has a past medical history of Closed nondisplaced fracture of scaphoid bone of right wrist (11/12/2019), Gout, and Great toe pain, left (11/12/2019).  He  has a past surgical history that includes hand surgery (Right).    Review of Systems   Constitutional: Negative for chills, fever, malaise/fatigue and weight loss.   Respiratory: Negative for cough and shortness of breath.    Cardiovascular: Negative for chest pain and palpitations.   Gastrointestinal: Negative for abdominal pain.   Genitourinary: Negative.   "  Musculoskeletal: Positive for joint pain (Right knee pain and swelling). Negative for myalgias.   Skin: Negative for rash.   Neurological: Negative for dizziness and weakness.   Psychiatric/Behavioral: Negative for depression.         Objective:     /76 (BP Location: Right arm, Patient Position: Sitting, BP Cuff Size: Adult)   Pulse 68   Temp 36.9 °C (98.5 °F) (Temporal)   Ht 1.753 m (5' 9\")   Wt 85.1 kg (187 lb 9.8 oz)   SpO2 98%  Body mass index is 27.71 kg/m².    Physical Exam  Constitutional:       General: He is not in acute distress.  HENT:      Head: Normocephalic and atraumatic.   Eyes:      Conjunctiva/sclera: Conjunctivae normal.      Pupils: Pupils are equal, round, and reactive to light.   Pulmonary:      Effort: Pulmonary effort is normal. No respiratory distress.   Abdominal:      General: There is no distension.   Musculoskeletal:      Cervical back: Normal range of motion and neck supple.      Right knee: Swelling present. No bony tenderness or crepitus. No tenderness.      Left knee: Normal.   Skin:     General: Skin is warm and dry.      Findings: No rash.   Neurological:      Mental Status: He is alert and oriented to person, place, and time.      Gait: Gait is intact.   Psychiatric:         Mood and Affect: Affect normal.        Imaging:  10/25/2021 MRI-knee, right: IMPRESSION:     1.  Large lateral meniscal cleavage tear of the posterior horn and body. Lateral meniscus is extruded peripherally and there is an adjacent parameniscal cyst.     2.  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  Fissuring of the lateral facet patellar cartilage     4.  Joint effusion containing debris    Labs  No recent labs to review  Assessment and Plan:   The following treatment plan was discussed    1. Acute pain of right knee  Acute.  A referral was placed to orthopedics per patient's request.  I did recommend that he follow-up with sports medicine and attempt to " get into see someone sooner to have fluid drained off.  I recommended that he continue to take the Aleve for the discomfort and inflammation.  Did offer Mobic if he continues to have to use the NSAID multiple times in a day.  Patient is not interested at this time.  - Referral to Orthopedics      Followup: No follow-ups on file.    Please note that this dictation was created using voice recognition software. I have made every reasonable attempt to correct obvious errors, but I expect that there are errors of grammar and possibly content that I did not discover before finalizing the note.

## 2022-02-05 NOTE — ASSESSMENT & PLAN NOTE
In early October patient was seen at the [facility] and had fluid drained from his knee.  He then followed up with sports medicine who completed an MRI.  The MRI showed residual fluid that was left in the joint but as it was insignificant they chose not to repeat the aspiration.  It was recommended that he use a knee brace, NSAIDs and heat for comfort.  Started to notice increase in swelling to the right knee over the last few days, pain started yesterday.  Has been taking Aleve which has been very helpful for the discomfort.  Would like a referral to orthopedics to have the residual fluid drained off.  Denies any redness to the joints, heat, numbness or tingling.